# Patient Record
Sex: FEMALE | Race: WHITE | NOT HISPANIC OR LATINO
[De-identification: names, ages, dates, MRNs, and addresses within clinical notes are randomized per-mention and may not be internally consistent; named-entity substitution may affect disease eponyms.]

---

## 2017-01-06 ENCOUNTER — APPOINTMENT (OUTPATIENT)
Dept: OBGYN | Facility: CLINIC | Age: 37
End: 2017-01-06

## 2017-01-06 VITALS
SYSTOLIC BLOOD PRESSURE: 112 MMHG | HEIGHT: 66 IN | DIASTOLIC BLOOD PRESSURE: 72 MMHG | BODY MASS INDEX: 20.25 KG/M2 | WEIGHT: 126 LBS | HEART RATE: 96 BPM

## 2017-01-31 ENCOUNTER — OUTPATIENT (OUTPATIENT)
Dept: OUTPATIENT SERVICES | Facility: HOSPITAL | Age: 37
LOS: 1 days | End: 2017-01-31
Payer: COMMERCIAL

## 2017-01-31 VITALS
SYSTOLIC BLOOD PRESSURE: 103 MMHG | TEMPERATURE: 99 F | HEART RATE: 78 BPM | HEIGHT: 66 IN | WEIGHT: 132.28 LBS | RESPIRATION RATE: 16 BRPM | DIASTOLIC BLOOD PRESSURE: 60 MMHG

## 2017-01-31 DIAGNOSIS — M25.9 JOINT DISORDER, UNSPECIFIED: Chronic | ICD-10-CM

## 2017-01-31 DIAGNOSIS — Z30.430 ENCOUNTER FOR INSERTION OF INTRAUTERINE CONTRACEPTIVE DEVICE: Chronic | ICD-10-CM

## 2017-01-31 DIAGNOSIS — A63.0 ANOGENITAL (VENEREAL) WARTS: ICD-10-CM

## 2017-01-31 DIAGNOSIS — Z01.818 ENCOUNTER FOR OTHER PREPROCEDURAL EXAMINATION: ICD-10-CM

## 2017-01-31 LAB
ANION GAP SERPL CALC-SCNC: 11 MMOL/L — SIGNIFICANT CHANGE UP (ref 5–17)
APTT BLD: 31.4 SEC — SIGNIFICANT CHANGE UP (ref 27.5–37.4)
BASOPHILS # BLD AUTO: 0 K/UL — SIGNIFICANT CHANGE UP (ref 0–0.2)
BASOPHILS NFR BLD AUTO: 0.8 % — SIGNIFICANT CHANGE UP (ref 0–2)
BUN SERPL-MCNC: 8 MG/DL — SIGNIFICANT CHANGE UP (ref 8–20)
CALCIUM SERPL-MCNC: 9.4 MG/DL — SIGNIFICANT CHANGE UP (ref 8.6–10.2)
CHLORIDE SERPL-SCNC: 102 MMOL/L — SIGNIFICANT CHANGE UP (ref 98–107)
CO2 SERPL-SCNC: 28 MMOL/L — SIGNIFICANT CHANGE UP (ref 22–29)
CREAT SERPL-MCNC: 0.71 MG/DL — SIGNIFICANT CHANGE UP (ref 0.5–1.3)
EOSINOPHIL # BLD AUTO: 0.2 K/UL — SIGNIFICANT CHANGE UP (ref 0–0.5)
EOSINOPHIL NFR BLD AUTO: 4.5 % — SIGNIFICANT CHANGE UP (ref 0–6)
GLUCOSE SERPL-MCNC: 91 MG/DL — SIGNIFICANT CHANGE UP (ref 70–115)
HCT VFR BLD CALC: 37.9 % — SIGNIFICANT CHANGE UP (ref 37–47)
HGB BLD-MCNC: 12.6 G/DL — SIGNIFICANT CHANGE UP (ref 12–16)
INR BLD: 1.09 RATIO — SIGNIFICANT CHANGE UP (ref 0.88–1.16)
LYMPHOCYTES # BLD AUTO: 1.8 K/UL — SIGNIFICANT CHANGE UP (ref 1–4.8)
LYMPHOCYTES # BLD AUTO: 36.5 % — SIGNIFICANT CHANGE UP (ref 20–55)
MCHC RBC-ENTMCNC: 30.3 PG — SIGNIFICANT CHANGE UP (ref 27–31)
MCHC RBC-ENTMCNC: 33.2 G/DL — SIGNIFICANT CHANGE UP (ref 32–36)
MCV RBC AUTO: 91.1 FL — SIGNIFICANT CHANGE UP (ref 81–99)
MONOCYTES # BLD AUTO: 0.4 K/UL — SIGNIFICANT CHANGE UP (ref 0–0.8)
MONOCYTES NFR BLD AUTO: 8.2 % — SIGNIFICANT CHANGE UP (ref 3–10)
NEUTROPHILS # BLD AUTO: 2.4 K/UL — SIGNIFICANT CHANGE UP (ref 1.8–8)
NEUTROPHILS NFR BLD AUTO: 50 % — SIGNIFICANT CHANGE UP (ref 37–73)
PLATELET # BLD AUTO: 260 K/UL — SIGNIFICANT CHANGE UP (ref 150–400)
POTASSIUM SERPL-MCNC: 3.5 MMOL/L — SIGNIFICANT CHANGE UP (ref 3.5–5.3)
POTASSIUM SERPL-SCNC: 3.5 MMOL/L — SIGNIFICANT CHANGE UP (ref 3.5–5.3)
PROTHROM AB SERPL-ACNC: 12 SEC — SIGNIFICANT CHANGE UP (ref 10–13.1)
RBC # BLD: 4.16 M/UL — LOW (ref 4.4–5.2)
RBC # FLD: 12.8 % — SIGNIFICANT CHANGE UP (ref 11–15.6)
SODIUM SERPL-SCNC: 141 MMOL/L — SIGNIFICANT CHANGE UP (ref 135–145)
WBC # BLD: 4.85 K/UL — SIGNIFICANT CHANGE UP (ref 4.8–10.8)
WBC # FLD AUTO: 4.85 K/UL — SIGNIFICANT CHANGE UP (ref 4.8–10.8)

## 2017-01-31 NOTE — H&P PST ADULT - NEGATIVE OPHTHALMOLOGIC SYMPTOMS
no pain R/no scleral injection R/no irritation R/no loss of vision L/no blurred vision L/no photophobia/no diplopia/no discharge L/no discharge R/no blurred vision R/no lacrimation L/no loss of vision R/no lacrimation R/no pain L/no irritation L/no scleral injection L

## 2017-01-31 NOTE — H&P PST ADULT - NEGATIVE BREAST SYMPTOMS
no breast tenderness L/no breast tenderness R/no nipple discharge R/no breast lump L/no breast lump R/no nipple discharge L

## 2017-01-31 NOTE — H&P PST ADULT - NEGATIVE FEMALE-SPECIFIC SYMPTOMS
no vaginal discharge/no pelvic pain/no menorrhagia/no abnormal vaginal bleeding/no dysmenorrhea/not applicable/no irregular menses/no amenorrhea/no spotting

## 2017-01-31 NOTE — H&P PST ADULT - RS GEN PE MLT RESP DETAILS PC
no intercostal retractions/airway patent/no subcutaneous emphysema/no rales/respirations non-labored/clear to auscultation bilaterally/breath sounds equal/no rhonchi/no wheezes/no chest wall tenderness/good air movement

## 2017-01-31 NOTE — H&P PST ADULT - NEGATIVE MUSCULOSKELETAL SYMPTOMS
no muscle cramps/no joint swelling/no leg pain L/no arm pain R/no neck pain/no arthritis/no stiffness/no arthralgia/no myalgia/no back pain/no leg pain R/no arm pain L/no muscle weakness

## 2017-01-31 NOTE — H&P PST ADULT - NEGATIVE NEUROLOGICAL SYMPTOMS
no transient paralysis/no syncope/no paresthesias/no tremors/no facial palsy/no loss of sensation/no hemiparesis/no loss of consciousness/no vertigo/no weakness/no confusion/no focal seizures/no difficulty walking/no generalized seizures/no headache

## 2017-01-31 NOTE — H&P PST ADULT - HISTORY OF PRESENT ILLNESS
35 y/o female with h/o HPV now presents for Loop electrosurgical excision procedure colposcopy cervix

## 2017-01-31 NOTE — H&P PST ADULT - NEGATIVE PSYCHIATRIC SYMPTOMS
no paranoia/no suicidal ideation/no mood swings/no visual hallucinations/no agitation/no depression/no insomnia/no anxiety/no memory loss

## 2017-01-31 NOTE — H&P PST ADULT - NEGATIVE ENMT SYMPTOMS
no nasal congestion/no sinus symptoms/no nose bleeds/no recurrent cold sores/no abnormal taste sensation/no vertigo/no dysphagia/no gum bleeding/no tinnitus/no hearing difficulty/no dry mouth/no nasal discharge/no ear pain/no post-nasal discharge/no nasal obstruction

## 2017-01-31 NOTE — H&P PST ADULT - GASTROINTESTINAL DETAILS
no rebound tenderness/no masses palpable/no rigidity/nontender/no distention/no guarding/no bruit/soft/no organomegaly/bowel sounds normal

## 2017-01-31 NOTE — H&P PST ADULT - NEGATIVE SKIN SYMPTOMS
no itching/no pitted nails/no brittle nails/no hair loss/no dryness/no change in size/color of mole/no rash/no tumor

## 2017-01-31 NOTE — H&P PST ADULT - NEGATIVE GENERAL SYMPTOMS
no weight gain/no fever/no weight loss/no malaise/no chills/no sweating/no polyuria/no polydipsia/no polyphagia/no anorexia

## 2017-01-31 NOTE — H&P PST ADULT - NEUROLOGICAL DETAILS
alert and oriented x 3/responds to verbal commands/sensation intact/no spontaneous movement/superficial reflexes intact/deep reflexes intact/cranial nerves intact/responds to pain

## 2017-01-31 NOTE — H&P PST ADULT - NSANTHOSAYNRD_GEN_A_CORE
No. APPLE screening performed.  STOP BANG Legend: 0-2 = LOW Risk; 3-4 = INTERMEDIATE Risk; 5-8 = HIGH Risk

## 2017-01-31 NOTE — H&P PST ADULT - NEGATIVE GENERAL GENITOURINARY SYMPTOMS
no bladder infections/normal urinary frequency/no flank pain L/no renal colic/no dysuria/no incontinence/no flank pain R/normal libido/no urinary hesitancy/no hematuria/no urine discoloration/no gas in urine/no nocturia

## 2017-01-31 NOTE — H&P PST ADULT - NEGATIVE GASTROINTESTINAL SYMPTOMS
no change in bowel habits/no nausea/no steatorrhea/no melena/no jaundice/no constipation/no hematochezia/no abdominal pain/no hiccoughs/no vomiting/no flatulence/no diarrhea

## 2017-01-31 NOTE — ASU PATIENT PROFILE, ADULT - VISION (WITH CORRECTIVE LENSES IF THE PATIENT USUALLY WEARS THEM):
distance/Partially impaired: cannot see medication labels or newsprint, but can see obstacles in path, and the surrounding layout; can count fingers at arm's length

## 2017-02-01 DIAGNOSIS — B37.9 CANDIDIASIS, UNSPECIFIED: ICD-10-CM

## 2017-02-01 DIAGNOSIS — Z01.818 ENCOUNTER FOR OTHER PREPROCEDURAL EXAMINATION: ICD-10-CM

## 2017-02-05 ENCOUNTER — RESULT REVIEW (OUTPATIENT)
Age: 37
End: 2017-02-05

## 2017-02-06 ENCOUNTER — OUTPATIENT (OUTPATIENT)
Dept: OUTPATIENT SERVICES | Facility: HOSPITAL | Age: 37
LOS: 1 days | End: 2017-02-06
Payer: COMMERCIAL

## 2017-02-06 VITALS
DIASTOLIC BLOOD PRESSURE: 50 MMHG | OXYGEN SATURATION: 100 % | WEIGHT: 126.99 LBS | RESPIRATION RATE: 16 BRPM | TEMPERATURE: 99 F | HEART RATE: 56 BPM | HEIGHT: 66 IN | SYSTOLIC BLOOD PRESSURE: 94 MMHG

## 2017-02-06 VITALS — HEART RATE: 72 BPM | OXYGEN SATURATION: 100 % | TEMPERATURE: 98 F | RESPIRATION RATE: 18 BRPM

## 2017-02-06 DIAGNOSIS — Z01.818 ENCOUNTER FOR OTHER PREPROCEDURAL EXAMINATION: ICD-10-CM

## 2017-02-06 DIAGNOSIS — A63.0 ANOGENITAL (VENEREAL) WARTS: ICD-10-CM

## 2017-02-06 DIAGNOSIS — B37.9 CANDIDIASIS, UNSPECIFIED: ICD-10-CM

## 2017-02-06 DIAGNOSIS — Z30.430 ENCOUNTER FOR INSERTION OF INTRAUTERINE CONTRACEPTIVE DEVICE: Chronic | ICD-10-CM

## 2017-02-06 DIAGNOSIS — M25.9 JOINT DISORDER, UNSPECIFIED: Chronic | ICD-10-CM

## 2017-02-06 PROCEDURE — 57460 BX OF CERVIX W/SCOPE LEEP: CPT

## 2017-02-06 PROCEDURE — 57522 CONIZATION OF CERVIX: CPT

## 2017-02-06 PROCEDURE — 88307 TISSUE EXAM BY PATHOLOGIST: CPT | Mod: 26

## 2017-02-06 PROCEDURE — 88307 TISSUE EXAM BY PATHOLOGIST: CPT

## 2017-02-06 RX ORDER — FENTANYL CITRATE 50 UG/ML
25 INJECTION INTRAVENOUS
Qty: 0 | Refills: 0 | Status: DISCONTINUED | OUTPATIENT
Start: 2017-02-06 | End: 2017-02-06

## 2017-02-06 RX ORDER — SODIUM CHLORIDE 9 MG/ML
1000 INJECTION, SOLUTION INTRAVENOUS
Qty: 0 | Refills: 0 | Status: DISCONTINUED | OUTPATIENT
Start: 2017-02-06 | End: 2017-02-06

## 2017-02-06 RX ORDER — ONDANSETRON 8 MG/1
4 TABLET, FILM COATED ORAL ONCE
Qty: 0 | Refills: 0 | Status: DISCONTINUED | OUTPATIENT
Start: 2017-02-06 | End: 2017-02-06

## 2017-02-06 RX ORDER — SODIUM CHLORIDE 9 MG/ML
3 INJECTION INTRAMUSCULAR; INTRAVENOUS; SUBCUTANEOUS ONCE
Qty: 0 | Refills: 0 | Status: DISCONTINUED | OUTPATIENT
Start: 2017-02-06 | End: 2017-02-06

## 2017-02-07 ENCOUNTER — TRANSCRIPTION ENCOUNTER (OUTPATIENT)
Age: 37
End: 2017-02-07

## 2017-02-09 LAB — SURGICAL PATHOLOGY FINAL REPORT - CH: SIGNIFICANT CHANGE UP

## 2017-02-24 ENCOUNTER — TRANSCRIPTION ENCOUNTER (OUTPATIENT)
Age: 37
End: 2017-02-24

## 2017-03-06 ENCOUNTER — APPOINTMENT (OUTPATIENT)
Dept: OBGYN | Facility: CLINIC | Age: 37
End: 2017-03-06

## 2017-03-06 VITALS — HEIGHT: 66 IN | DIASTOLIC BLOOD PRESSURE: 68 MMHG | SYSTOLIC BLOOD PRESSURE: 118 MMHG

## 2017-03-06 LAB
HCG UR QL: NEGATIVE
QUALITY CONTROL: YES

## 2017-03-10 LAB
CORE LAB BIOPSY: NORMAL
CYTOLOGY CVX/VAG DOC THIN PREP: NORMAL
HPV HIGH+LOW RISK DNA PNL CVX: NEGATIVE

## 2017-03-22 ENCOUNTER — APPOINTMENT (OUTPATIENT)
Dept: OBGYN | Facility: CLINIC | Age: 37
End: 2017-03-22

## 2017-03-22 VITALS
DIASTOLIC BLOOD PRESSURE: 70 MMHG | SYSTOLIC BLOOD PRESSURE: 110 MMHG | WEIGHT: 126 LBS | BODY MASS INDEX: 20.25 KG/M2 | HEIGHT: 66 IN

## 2017-04-13 PROCEDURE — 85610 PROTHROMBIN TIME: CPT

## 2017-04-13 PROCEDURE — 80048 BASIC METABOLIC PNL TOTAL CA: CPT

## 2017-04-13 PROCEDURE — G0463: CPT

## 2017-04-13 PROCEDURE — 85730 THROMBOPLASTIN TIME PARTIAL: CPT

## 2017-04-13 PROCEDURE — 85027 COMPLETE CBC AUTOMATED: CPT

## 2017-04-18 ENCOUNTER — APPOINTMENT (OUTPATIENT)
Dept: OBGYN | Facility: CLINIC | Age: 37
End: 2017-04-18

## 2017-04-18 VITALS
DIASTOLIC BLOOD PRESSURE: 68 MMHG | HEIGHT: 66 IN | WEIGHT: 126 LBS | BODY MASS INDEX: 20.25 KG/M2 | SYSTOLIC BLOOD PRESSURE: 103 MMHG | HEART RATE: 69 BPM

## 2017-04-18 LAB
BILIRUB UR QL STRIP: NEGATIVE
GLUCOSE UR-MCNC: NEGATIVE
HCG UR QL: 0.2 EU/DL
HGB UR QL STRIP.AUTO: NORMAL
KETONES UR-MCNC: NEGATIVE
LEUKOCYTE ESTERASE UR QL STRIP: NORMAL
NITRITE UR QL STRIP: POSITIVE
PH UR STRIP: 7
PROT UR STRIP-MCNC: NORMAL
SP GR UR STRIP: 1.02

## 2017-04-24 LAB — BACTERIA UR CULT: ABNORMAL

## 2017-09-08 ENCOUNTER — APPOINTMENT (OUTPATIENT)
Dept: OBGYN | Facility: CLINIC | Age: 37
End: 2017-09-08

## 2018-02-27 ENCOUNTER — NON-APPOINTMENT (OUTPATIENT)
Age: 38
End: 2018-02-27

## 2018-02-27 ENCOUNTER — APPOINTMENT (OUTPATIENT)
Dept: OBGYN | Facility: CLINIC | Age: 38
End: 2018-02-27
Payer: COMMERCIAL

## 2018-02-27 VITALS
WEIGHT: 134 LBS | DIASTOLIC BLOOD PRESSURE: 60 MMHG | SYSTOLIC BLOOD PRESSURE: 100 MMHG | HEIGHT: 66 IN | BODY MASS INDEX: 21.53 KG/M2

## 2018-02-27 PROCEDURE — 76817 TRANSVAGINAL US OBSTETRIC: CPT

## 2018-02-27 PROCEDURE — 0501F PRENATAL FLOW SHEET: CPT

## 2018-03-02 ENCOUNTER — APPOINTMENT (OUTPATIENT)
Dept: MATERNAL FETAL MEDICINE | Facility: CLINIC | Age: 38
End: 2018-03-02

## 2018-03-06 ENCOUNTER — APPOINTMENT (OUTPATIENT)
Dept: ANTEPARTUM | Facility: CLINIC | Age: 38
End: 2018-03-06

## 2018-03-06 ENCOUNTER — ASOB RESULT (OUTPATIENT)
Age: 38
End: 2018-03-06

## 2018-03-06 ENCOUNTER — APPOINTMENT (OUTPATIENT)
Dept: ANTEPARTUM | Facility: CLINIC | Age: 38
End: 2018-03-06
Payer: COMMERCIAL

## 2018-03-06 ENCOUNTER — APPOINTMENT (OUTPATIENT)
Dept: MATERNAL FETAL MEDICINE | Facility: CLINIC | Age: 38
End: 2018-03-06

## 2018-03-06 PROCEDURE — 76801 OB US < 14 WKS SINGLE FETUS: CPT

## 2018-03-06 PROCEDURE — 36416 COLLJ CAPILLARY BLOOD SPEC: CPT

## 2018-03-06 PROCEDURE — 76813 OB US NUCHAL MEAS 1 GEST: CPT

## 2018-03-12 LAB
1ST TRIMESTER DATA: NORMAL
ADDENDUM DOC: NORMAL
AFP PNL SERPL: NORMAL
AFP SERPL-ACNC: NORMAL
CLINICAL BIOCHEMIST REVIEW: ABNORMAL
FREE BETA HCG 1ST TRIMESTER: NORMAL
Lab: NORMAL
NASAL BONE: PRESENT
NOTES NTD: NORMAL
NT: NORMAL
PAPP-A SERPL-ACNC: NORMAL
TRISOMY 18/3: NORMAL

## 2018-03-14 ENCOUNTER — ASOB RESULT (OUTPATIENT)
Age: 38
End: 2018-03-14

## 2018-03-14 ENCOUNTER — APPOINTMENT (OUTPATIENT)
Dept: MATERNAL FETAL MEDICINE | Facility: CLINIC | Age: 38
End: 2018-03-14
Payer: COMMERCIAL

## 2018-03-14 ENCOUNTER — APPOINTMENT (OUTPATIENT)
Dept: ANTEPARTUM | Facility: CLINIC | Age: 38
End: 2018-03-14

## 2018-03-14 PROCEDURE — 99214 OFFICE O/P EST MOD 30 MIN: CPT

## 2018-03-16 ENCOUNTER — LABORATORY RESULT (OUTPATIENT)
Age: 38
End: 2018-03-16

## 2018-03-27 RX ORDER — HUMAN RHO(D) IMMUNE GLOBULIN 300 UG/1
1500 INJECTION, SOLUTION INTRAMUSCULAR
Qty: 1 | Refills: 0 | Status: ACTIVE | COMMUNITY
Start: 2018-03-27 | End: 1900-01-01

## 2018-03-29 ENCOUNTER — APPOINTMENT (OUTPATIENT)
Dept: OBGYN | Facility: CLINIC | Age: 38
End: 2018-03-29

## 2018-04-02 ENCOUNTER — ASOB RESULT (OUTPATIENT)
Age: 38
End: 2018-04-02

## 2018-04-02 ENCOUNTER — LABORATORY RESULT (OUTPATIENT)
Age: 38
End: 2018-04-02

## 2018-04-02 ENCOUNTER — APPOINTMENT (OUTPATIENT)
Dept: MATERNAL FETAL MEDICINE | Facility: CLINIC | Age: 38
End: 2018-04-02
Payer: COMMERCIAL

## 2018-04-02 ENCOUNTER — APPOINTMENT (OUTPATIENT)
Dept: ANTEPARTUM | Facility: CLINIC | Age: 38
End: 2018-04-02
Payer: COMMERCIAL

## 2018-04-02 PROCEDURE — 99241 OFFICE CONSULTATION NEW/ESTAB PATIENT 15 MIN: CPT

## 2018-04-02 PROCEDURE — ZZZZZ: CPT

## 2018-04-02 PROCEDURE — 59000 AMNIOCENTESIS DIAGNOSTIC: CPT

## 2018-04-02 PROCEDURE — 76946 ECHO GUIDE FOR AMNIOCENTESIS: CPT

## 2018-04-02 PROCEDURE — 76816 OB US FOLLOW-UP PER FETUS: CPT

## 2018-04-02 PROCEDURE — 99241 OFFICE CONSULTATION NEW/ESTAB PATIENT 15 MIN: CPT | Mod: 25

## 2018-04-02 RX ORDER — HUMAN RHO(D) IMMUNE GLOBULIN 300 UG/1
1500 INJECTION, SOLUTION INTRAMUSCULAR
Refills: 0 | Status: COMPLETED | OUTPATIENT
Start: 2018-04-02

## 2018-04-02 RX ADMIN — HUMAN RHO(D) IMMUNE GLOBULIN 0 UNIT: 300 INJECTION, SOLUTION INTRAMUSCULAR at 00:00

## 2018-04-03 ENCOUNTER — LABORATORY RESULT (OUTPATIENT)
Age: 38
End: 2018-04-03

## 2018-04-05 ENCOUNTER — APPOINTMENT (OUTPATIENT)
Dept: MATERNAL FETAL MEDICINE | Facility: CLINIC | Age: 38
End: 2018-04-05

## 2018-04-05 ENCOUNTER — APPOINTMENT (OUTPATIENT)
Dept: ANTEPARTUM | Facility: CLINIC | Age: 38
End: 2018-04-05

## 2018-05-01 ENCOUNTER — APPOINTMENT (OUTPATIENT)
Dept: ANTEPARTUM | Facility: CLINIC | Age: 38
End: 2018-05-01
Payer: COMMERCIAL

## 2018-05-01 ENCOUNTER — ASOB RESULT (OUTPATIENT)
Age: 38
End: 2018-05-01

## 2018-05-01 ENCOUNTER — APPOINTMENT (OUTPATIENT)
Dept: MATERNAL FETAL MEDICINE | Facility: CLINIC | Age: 38
End: 2018-05-01
Payer: COMMERCIAL

## 2018-05-01 VITALS
WEIGHT: 142 LBS | SYSTOLIC BLOOD PRESSURE: 98 MMHG | DIASTOLIC BLOOD PRESSURE: 56 MMHG | HEIGHT: 66 IN | BODY MASS INDEX: 22.82 KG/M2

## 2018-05-01 DIAGNOSIS — Z78.9 OTHER SPECIFIED HEALTH STATUS: ICD-10-CM

## 2018-05-01 PROCEDURE — 76811 OB US DETAILED SNGL FETUS: CPT

## 2018-05-01 PROCEDURE — 99244 OFF/OP CNSLTJ NEW/EST MOD 40: CPT

## 2018-05-01 RX ORDER — SULFAMETHOXAZOLE AND TRIMETHOPRIM 800; 160 MG/1; MG/1
800-160 TABLET ORAL TWICE DAILY
Qty: 6 | Refills: 1 | Status: DISCONTINUED | COMMUNITY
Start: 2017-04-18 | End: 2018-05-01

## 2018-05-01 RX ORDER — FLUCONAZOLE 150 MG/1
150 TABLET ORAL
Qty: 1 | Refills: 1 | Status: DISCONTINUED | COMMUNITY
Start: 2017-06-30 | End: 2018-05-01

## 2018-05-01 RX ORDER — VITAMIN C, CALCIUM, IRON, VITAMIN D3, VITAMIN E, VITAMIN B1, VITAMIN B2, VITAMIN B3, VITAMIN B6, FOLIC ACID, IODINE, ZINC, COPPER, DOCUSATE SODIUM, DOCOSAHEXAENOIC ACID (DHA) 27-1-50 MG
KIT ORAL
Refills: 0 | Status: ACTIVE | COMMUNITY

## 2018-05-01 RX ORDER — ACETAMINOPHEN/DIPHENHYDRAMINE 500MG-25MG
1000 TABLET ORAL
Refills: 0 | Status: ACTIVE | COMMUNITY

## 2018-05-02 ENCOUNTER — APPOINTMENT (OUTPATIENT)
Dept: OBGYN | Facility: CLINIC | Age: 38
End: 2018-05-02
Payer: COMMERCIAL

## 2018-05-02 VITALS
BODY MASS INDEX: 23.14 KG/M2 | DIASTOLIC BLOOD PRESSURE: 64 MMHG | SYSTOLIC BLOOD PRESSURE: 100 MMHG | WEIGHT: 144 LBS | HEIGHT: 66 IN

## 2018-05-02 PROCEDURE — 0502F SUBSEQUENT PRENATAL CARE: CPT

## 2018-05-29 ENCOUNTER — APPOINTMENT (OUTPATIENT)
Dept: OBGYN | Facility: CLINIC | Age: 38
End: 2018-05-29
Payer: COMMERCIAL

## 2018-05-29 VITALS
SYSTOLIC BLOOD PRESSURE: 103 MMHG | BODY MASS INDEX: 23.78 KG/M2 | WEIGHT: 148 LBS | DIASTOLIC BLOOD PRESSURE: 71 MMHG | HEIGHT: 66 IN

## 2018-05-29 PROCEDURE — 0502F SUBSEQUENT PRENATAL CARE: CPT

## 2018-06-04 ENCOUNTER — LABORATORY RESULT (OUTPATIENT)
Age: 38
End: 2018-06-04

## 2018-06-05 ENCOUNTER — APPOINTMENT (OUTPATIENT)
Dept: OBGYN | Facility: CLINIC | Age: 38
End: 2018-06-05
Payer: COMMERCIAL

## 2018-06-05 ENCOUNTER — APPOINTMENT (OUTPATIENT)
Dept: ANTEPARTUM | Facility: CLINIC | Age: 38
End: 2018-06-05
Payer: COMMERCIAL

## 2018-06-05 ENCOUNTER — ASOB RESULT (OUTPATIENT)
Age: 38
End: 2018-06-05

## 2018-06-05 VITALS
BODY MASS INDEX: 23.78 KG/M2 | HEIGHT: 66 IN | WEIGHT: 148 LBS | DIASTOLIC BLOOD PRESSURE: 66 MMHG | SYSTOLIC BLOOD PRESSURE: 105 MMHG

## 2018-06-05 PROCEDURE — 0502F SUBSEQUENT PRENATAL CARE: CPT

## 2018-06-05 PROCEDURE — 76817 TRANSVAGINAL US OBSTETRIC: CPT

## 2018-06-05 PROCEDURE — 76815 OB US LIMITED FETUS(S): CPT

## 2018-06-26 ENCOUNTER — APPOINTMENT (OUTPATIENT)
Dept: OBGYN | Facility: CLINIC | Age: 38
End: 2018-06-26
Payer: COMMERCIAL

## 2018-06-26 VITALS
SYSTOLIC BLOOD PRESSURE: 114 MMHG | WEIGHT: 152 LBS | BODY MASS INDEX: 24.43 KG/M2 | HEIGHT: 66 IN | DIASTOLIC BLOOD PRESSURE: 77 MMHG

## 2018-06-26 PROCEDURE — 0502F SUBSEQUENT PRENATAL CARE: CPT

## 2018-06-27 ENCOUNTER — ASOB RESULT (OUTPATIENT)
Age: 38
End: 2018-06-27

## 2018-06-27 ENCOUNTER — APPOINTMENT (OUTPATIENT)
Dept: ANTEPARTUM | Facility: CLINIC | Age: 38
End: 2018-06-27
Payer: COMMERCIAL

## 2018-06-27 PROCEDURE — 76820 UMBILICAL ARTERY ECHO: CPT

## 2018-06-27 PROCEDURE — 76816 OB US FOLLOW-UP PER FETUS: CPT

## 2018-06-27 PROCEDURE — 76819 FETAL BIOPHYS PROFIL W/O NST: CPT

## 2018-07-12 ENCOUNTER — LABORATORY RESULT (OUTPATIENT)
Age: 38
End: 2018-07-12

## 2018-07-13 ENCOUNTER — APPOINTMENT (OUTPATIENT)
Dept: OBGYN | Facility: CLINIC | Age: 38
End: 2018-07-13
Payer: COMMERCIAL

## 2018-07-13 VITALS
BODY MASS INDEX: 24.75 KG/M2 | DIASTOLIC BLOOD PRESSURE: 60 MMHG | WEIGHT: 154 LBS | SYSTOLIC BLOOD PRESSURE: 110 MMHG | HEIGHT: 66 IN

## 2018-07-13 PROCEDURE — 0502F SUBSEQUENT PRENATAL CARE: CPT

## 2018-07-16 PROBLEM — A63.0 ANOGENITAL (VENEREAL) WARTS: Chronic | Status: ACTIVE | Noted: 2017-01-31

## 2018-07-20 ENCOUNTER — APPOINTMENT (OUTPATIENT)
Dept: OBGYN | Facility: CLINIC | Age: 38
End: 2018-07-20
Payer: COMMERCIAL

## 2018-07-20 VITALS
WEIGHT: 156 LBS | BODY MASS INDEX: 25.07 KG/M2 | HEIGHT: 66 IN | DIASTOLIC BLOOD PRESSURE: 60 MMHG | SYSTOLIC BLOOD PRESSURE: 120 MMHG

## 2018-07-20 PROCEDURE — 0502F SUBSEQUENT PRENATAL CARE: CPT

## 2018-07-25 ENCOUNTER — APPOINTMENT (OUTPATIENT)
Dept: ANTEPARTUM | Facility: CLINIC | Age: 38
End: 2018-07-25

## 2018-07-25 ENCOUNTER — ASOB RESULT (OUTPATIENT)
Age: 38
End: 2018-07-25

## 2018-07-25 ENCOUNTER — APPOINTMENT (OUTPATIENT)
Dept: ANTEPARTUM | Facility: CLINIC | Age: 38
End: 2018-07-25
Payer: COMMERCIAL

## 2018-07-25 ENCOUNTER — APPOINTMENT (OUTPATIENT)
Dept: MATERNAL FETAL MEDICINE | Facility: CLINIC | Age: 38
End: 2018-07-25
Payer: COMMERCIAL

## 2018-07-25 VITALS
HEIGHT: 66 IN | SYSTOLIC BLOOD PRESSURE: 108 MMHG | WEIGHT: 159.56 LBS | BODY MASS INDEX: 25.64 KG/M2 | DIASTOLIC BLOOD PRESSURE: 64 MMHG

## 2018-07-25 PROCEDURE — 76819 FETAL BIOPHYS PROFIL W/O NST: CPT

## 2018-07-25 PROCEDURE — 76816 OB US FOLLOW-UP PER FETUS: CPT

## 2018-07-25 PROCEDURE — 99244 OFF/OP CNSLTJ NEW/EST MOD 40: CPT

## 2018-07-25 PROCEDURE — 76820 UMBILICAL ARTERY ECHO: CPT

## 2018-07-27 ENCOUNTER — APPOINTMENT (OUTPATIENT)
Dept: OBGYN | Facility: CLINIC | Age: 38
End: 2018-07-27
Payer: COMMERCIAL

## 2018-07-27 VITALS
WEIGHT: 159 LBS | DIASTOLIC BLOOD PRESSURE: 70 MMHG | BODY MASS INDEX: 25.55 KG/M2 | HEIGHT: 66 IN | SYSTOLIC BLOOD PRESSURE: 112 MMHG

## 2018-07-27 PROCEDURE — 99212 OFFICE O/P EST SF 10 MIN: CPT | Mod: 25

## 2018-07-27 PROCEDURE — 0502F SUBSEQUENT PRENATAL CARE: CPT

## 2018-07-27 PROCEDURE — 96372 THER/PROPH/DIAG INJ SC/IM: CPT

## 2018-07-31 ENCOUNTER — APPOINTMENT (OUTPATIENT)
Dept: OBGYN | Facility: CLINIC | Age: 38
End: 2018-07-31
Payer: COMMERCIAL

## 2018-07-31 ENCOUNTER — LABORATORY RESULT (OUTPATIENT)
Age: 38
End: 2018-07-31

## 2018-07-31 PROCEDURE — 59025 FETAL NON-STRESS TEST: CPT

## 2018-07-31 PROCEDURE — 0502F SUBSEQUENT PRENATAL CARE: CPT

## 2018-08-03 ENCOUNTER — APPOINTMENT (OUTPATIENT)
Dept: OBGYN | Facility: CLINIC | Age: 38
End: 2018-08-03

## 2018-08-07 ENCOUNTER — APPOINTMENT (OUTPATIENT)
Dept: OBGYN | Facility: CLINIC | Age: 38
End: 2018-08-07
Payer: COMMERCIAL

## 2018-08-07 LAB
BASOPHILS # BLD AUTO: 0 K/UL
BASOPHILS NFR BLD AUTO: 0 %
EOSINOPHIL # BLD AUTO: 0.15 K/UL
EOSINOPHIL NFR BLD AUTO: 2.5 %
HCT VFR BLD CALC: 37 %
HGB BLD-MCNC: 12.3 G/DL
LYMPHOCYTES # BLD AUTO: 1.3 K/UL
LYMPHOCYTES NFR BLD AUTO: 22 %
MAN DIFF?: NORMAL
MCHC RBC-ENTMCNC: 31.7 PG
MCHC RBC-ENTMCNC: 33.2 GM/DL
MCV RBC AUTO: 95.4 FL
MONOCYTES # BLD AUTO: 0.4 K/UL
MONOCYTES NFR BLD AUTO: 6.8 %
NEUTROPHILS # BLD AUTO: 4.07 K/UL
NEUTROPHILS NFR BLD AUTO: 66.2 %
PLATELET # BLD AUTO: 184 K/UL
RBC # BLD: 3.88 M/UL
RBC # FLD: 14.2 %
WBC # FLD AUTO: 5.92 K/UL

## 2018-08-07 PROCEDURE — 59025 FETAL NON-STRESS TEST: CPT

## 2018-08-07 PROCEDURE — 0502F SUBSEQUENT PRENATAL CARE: CPT

## 2018-08-15 ENCOUNTER — LABORATORY RESULT (OUTPATIENT)
Age: 38
End: 2018-08-15

## 2018-08-15 ENCOUNTER — APPOINTMENT (OUTPATIENT)
Dept: OBGYN | Facility: CLINIC | Age: 38
End: 2018-08-15
Payer: COMMERCIAL

## 2018-08-15 VITALS
DIASTOLIC BLOOD PRESSURE: 70 MMHG | BODY MASS INDEX: 26.36 KG/M2 | HEIGHT: 66 IN | SYSTOLIC BLOOD PRESSURE: 120 MMHG | WEIGHT: 164 LBS

## 2018-08-15 PROCEDURE — 59025 FETAL NON-STRESS TEST: CPT

## 2018-08-15 PROCEDURE — 0502F SUBSEQUENT PRENATAL CARE: CPT

## 2018-08-22 ENCOUNTER — APPOINTMENT (OUTPATIENT)
Dept: ANTEPARTUM | Facility: CLINIC | Age: 38
End: 2018-08-22
Payer: COMMERCIAL

## 2018-08-22 ENCOUNTER — APPOINTMENT (OUTPATIENT)
Dept: OBGYN | Facility: CLINIC | Age: 38
End: 2018-08-22
Payer: COMMERCIAL

## 2018-08-22 ENCOUNTER — ASOB RESULT (OUTPATIENT)
Age: 38
End: 2018-08-22

## 2018-08-22 VITALS
SYSTOLIC BLOOD PRESSURE: 110 MMHG | DIASTOLIC BLOOD PRESSURE: 70 MMHG | HEIGHT: 66 IN | WEIGHT: 166 LBS | BODY MASS INDEX: 26.68 KG/M2

## 2018-08-22 PROCEDURE — 76816 OB US FOLLOW-UP PER FETUS: CPT

## 2018-08-22 PROCEDURE — 0502F SUBSEQUENT PRENATAL CARE: CPT

## 2018-08-22 PROCEDURE — 76819 FETAL BIOPHYS PROFIL W/O NST: CPT

## 2018-08-22 PROCEDURE — 76820 UMBILICAL ARTERY ECHO: CPT

## 2018-08-22 PROCEDURE — 59025 FETAL NON-STRESS TEST: CPT

## 2018-08-29 ENCOUNTER — ASOB RESULT (OUTPATIENT)
Age: 38
End: 2018-08-29

## 2018-08-29 ENCOUNTER — APPOINTMENT (OUTPATIENT)
Dept: ANTEPARTUM | Facility: CLINIC | Age: 38
End: 2018-08-29
Payer: COMMERCIAL

## 2018-08-29 ENCOUNTER — APPOINTMENT (OUTPATIENT)
Dept: OBGYN | Facility: CLINIC | Age: 38
End: 2018-08-29
Payer: COMMERCIAL

## 2018-08-29 VITALS
DIASTOLIC BLOOD PRESSURE: 60 MMHG | SYSTOLIC BLOOD PRESSURE: 120 MMHG | HEIGHT: 66 IN | BODY MASS INDEX: 26.36 KG/M2 | WEIGHT: 164 LBS

## 2018-08-29 PROCEDURE — 76819 FETAL BIOPHYS PROFIL W/O NST: CPT

## 2018-08-29 PROCEDURE — 59025 FETAL NON-STRESS TEST: CPT

## 2018-08-29 PROCEDURE — 0502F SUBSEQUENT PRENATAL CARE: CPT

## 2018-09-03 LAB
ALBUMIN SERPL ELPH-MCNC: 3.3 G/DL
ALP BLD-CCNC: 140 U/L
ALT SERPL-CCNC: 14 U/L
ANION GAP SERPL CALC-SCNC: 11 MMOL/L
APTT BLD: 26.3 SEC
AST SERPL-CCNC: 22 U/L
BASOPHILS # BLD AUTO: 0.02 K/UL
BASOPHILS NFR BLD AUTO: 0.3 %
BILE AC SER-MCNC: 8.7 UMOL/L
BILEACIDS T: 4.3 UMOL/L
BILIRUB SERPL-MCNC: 0.2 MG/DL
BUN SERPL-MCNC: 7 MG/DL
CALCIUM SERPL-MCNC: 9.2 MG/DL
CHENDEOXYCHOLIC ACID: 1.5 UMOL/L
CHLORIDE SERPL-SCNC: 103 MMOL/L
CHOLIC ACID: 1.5 UMOL/L
CO2 SERPL-SCNC: 23 MMOL/L
CREAT SERPL-MCNC: 0.61 MG/DL
DEOXYCHOLIC ACID: 1.3 UMOL/L
EOSINOPHIL # BLD AUTO: 0.12 K/UL
EOSINOPHIL NFR BLD AUTO: 2 %
FIBRINOGEN PPP COAG.DERIVED-MCNC: 569 MG/DL
GLUCOSE SERPL-MCNC: 78 MG/DL
HCT VFR BLD CALC: 37.5 %
HGB BLD-MCNC: 12.4 G/DL
IMM GRANULOCYTES NFR BLD AUTO: 0.7 %
INR PPP: 0.87 RATIO
LYMPHOCYTES # BLD AUTO: 1.5 K/UL
LYMPHOCYTES NFR BLD AUTO: 24.9 %
MAN DIFF?: NORMAL
MCHC RBC-ENTMCNC: 31.8 PG
MCHC RBC-ENTMCNC: 33.1 GM/DL
MCV RBC AUTO: 96.2 FL
MONOCYTES # BLD AUTO: 0.64 K/UL
MONOCYTES NFR BLD AUTO: 10.6 %
NEUTROPHILS # BLD AUTO: 3.71 K/UL
NEUTROPHILS NFR BLD AUTO: 61.5 %
PLATELET # BLD AUTO: 170 K/UL
POTASSIUM SERPL-SCNC: 4.1 MMOL/L
PROT SERPL-MCNC: 5.2 G/DL
PT BLD: 9.8 SEC
RBC # BLD: 3.9 M/UL
RBC # FLD: 14 %
SODIUM SERPL-SCNC: 137 MMOL/L
URATE SERPL-MCNC: 4.7 MG/DL
URSODEOXYCH: <0.1 UMOL/L
WBC # FLD AUTO: 6.03 K/UL

## 2018-09-05 ENCOUNTER — APPOINTMENT (OUTPATIENT)
Dept: ANTEPARTUM | Facility: CLINIC | Age: 38
End: 2018-09-05
Payer: COMMERCIAL

## 2018-09-05 ENCOUNTER — ASOB RESULT (OUTPATIENT)
Age: 38
End: 2018-09-05

## 2018-09-05 ENCOUNTER — APPOINTMENT (OUTPATIENT)
Dept: OBGYN | Facility: CLINIC | Age: 38
End: 2018-09-05
Payer: COMMERCIAL

## 2018-09-05 VITALS
WEIGHT: 166 LBS | SYSTOLIC BLOOD PRESSURE: 120 MMHG | HEIGHT: 66 IN | BODY MASS INDEX: 26.68 KG/M2 | DIASTOLIC BLOOD PRESSURE: 70 MMHG

## 2018-09-05 PROCEDURE — 76819 FETAL BIOPHYS PROFIL W/O NST: CPT

## 2018-09-05 PROCEDURE — 0501F PRENATAL FLOW SHEET: CPT

## 2018-09-11 ENCOUNTER — APPOINTMENT (OUTPATIENT)
Dept: ANTEPARTUM | Facility: CLINIC | Age: 38
End: 2018-09-11
Payer: COMMERCIAL

## 2018-09-11 ENCOUNTER — ASOB RESULT (OUTPATIENT)
Age: 38
End: 2018-09-11

## 2018-09-11 ENCOUNTER — APPOINTMENT (OUTPATIENT)
Dept: OBGYN | Facility: CLINIC | Age: 38
End: 2018-09-11
Payer: COMMERCIAL

## 2018-09-11 VITALS
DIASTOLIC BLOOD PRESSURE: 70 MMHG | BODY MASS INDEX: 26.68 KG/M2 | WEIGHT: 166 LBS | SYSTOLIC BLOOD PRESSURE: 120 MMHG | HEIGHT: 66 IN

## 2018-09-11 PROCEDURE — 76815 OB US LIMITED FETUS(S): CPT

## 2018-09-11 PROCEDURE — 0502F SUBSEQUENT PRENATAL CARE: CPT

## 2018-09-11 PROCEDURE — 59025 FETAL NON-STRESS TEST: CPT

## 2018-09-11 PROCEDURE — 76819 FETAL BIOPHYS PROFIL W/O NST: CPT

## 2018-09-13 ENCOUNTER — INPATIENT (INPATIENT)
Facility: HOSPITAL | Age: 38
LOS: 3 days | Discharge: HOME | End: 2018-09-17
Attending: OBSTETRICS & GYNECOLOGY | Admitting: OBSTETRICS & GYNECOLOGY
Payer: COMMERCIAL

## 2018-09-13 DIAGNOSIS — O47.1 FALSE LABOR AT OR AFTER 37 COMPLETED WEEKS OF GESTATION: ICD-10-CM

## 2018-09-13 DIAGNOSIS — Z30.430 ENCOUNTER FOR INSERTION OF INTRAUTERINE CONTRACEPTIVE DEVICE: Chronic | ICD-10-CM

## 2018-09-13 DIAGNOSIS — M25.9 JOINT DISORDER, UNSPECIFIED: Chronic | ICD-10-CM

## 2018-09-13 LAB
ALLERGY+IMMUNOLOGY DIAG STUDY NOTE: SIGNIFICANT CHANGE UP
APPEARANCE UR: CLEAR — SIGNIFICANT CHANGE UP
BACTERIA # UR AUTO: ABNORMAL
BASOPHILS # BLD AUTO: 0 K/UL — SIGNIFICANT CHANGE UP (ref 0–0.2)
BASOPHILS NFR BLD AUTO: 0.2 % — SIGNIFICANT CHANGE UP (ref 0–2)
BILIRUB UR-MCNC: NEGATIVE — SIGNIFICANT CHANGE UP
BLD GP AB SCN SERPL QL: ABNORMAL
COLOR SPEC: YELLOW — SIGNIFICANT CHANGE UP
DIFF PNL FLD: NEGATIVE — SIGNIFICANT CHANGE UP
DIR ANTIGLOB POLYSPECIFIC INTERPRETATION: SIGNIFICANT CHANGE UP
EOSINOPHIL # BLD AUTO: 0.1 K/UL — SIGNIFICANT CHANGE UP (ref 0–0.5)
EOSINOPHIL NFR BLD AUTO: 1.1 % — SIGNIFICANT CHANGE UP (ref 0–6)
EPI CELLS # UR: SIGNIFICANT CHANGE UP
GLUCOSE UR QL: NEGATIVE MG/DL — SIGNIFICANT CHANGE UP
HCT VFR BLD CALC: 35.1 % — LOW (ref 37–47)
HGB BLD-MCNC: 11.4 G/DL — LOW (ref 12–16)
KETONES UR-MCNC: NEGATIVE — SIGNIFICANT CHANGE UP
LEUKOCYTE ESTERASE UR-ACNC: ABNORMAL
LYMPHOCYTES # BLD AUTO: 1.3 K/UL — SIGNIFICANT CHANGE UP (ref 1–4.8)
LYMPHOCYTES # BLD AUTO: 22.4 % — SIGNIFICANT CHANGE UP (ref 20–55)
MCHC RBC-ENTMCNC: 30.4 PG — SIGNIFICANT CHANGE UP (ref 27–31)
MCHC RBC-ENTMCNC: 32.5 G/DL — SIGNIFICANT CHANGE UP (ref 32–36)
MCV RBC AUTO: 93.6 FL — SIGNIFICANT CHANGE UP (ref 81–99)
MONOCYTES # BLD AUTO: 0.5 K/UL — SIGNIFICANT CHANGE UP (ref 0–0.8)
MONOCYTES NFR BLD AUTO: 8.5 % — SIGNIFICANT CHANGE UP (ref 3–10)
NEUTROPHILS # BLD AUTO: 3.8 K/UL — SIGNIFICANT CHANGE UP (ref 1.8–8)
NEUTROPHILS NFR BLD AUTO: 67.4 % — SIGNIFICANT CHANGE UP (ref 37–73)
NITRITE UR-MCNC: NEGATIVE — SIGNIFICANT CHANGE UP
PH UR: 6 — SIGNIFICANT CHANGE UP (ref 5–8)
PLATELET # BLD AUTO: 140 K/UL — LOW (ref 150–400)
PROT UR-MCNC: 15 MG/DL
RBC # BLD: 3.75 M/UL — LOW (ref 4.4–5.2)
RBC # FLD: 13.2 % — SIGNIFICANT CHANGE UP (ref 11–15.6)
RBC CASTS # UR COMP ASSIST: SIGNIFICANT CHANGE UP /HPF (ref 0–4)
SP GR SPEC: 1.01 — SIGNIFICANT CHANGE UP (ref 1.01–1.02)
TYPE + AB SCN PNL BLD: SIGNIFICANT CHANGE UP
UROBILINOGEN FLD QL: NEGATIVE MG/DL — SIGNIFICANT CHANGE UP
WBC # BLD: 5.6 K/UL — SIGNIFICANT CHANGE UP (ref 4.8–10.8)
WBC # FLD AUTO: 5.6 K/UL — SIGNIFICANT CHANGE UP (ref 4.8–10.8)
WBC UR QL: SIGNIFICANT CHANGE UP

## 2018-09-13 PROCEDURE — 86077 PHYS BLOOD BANK SERV XMATCH: CPT

## 2018-09-13 RX ORDER — CITRIC ACID/SODIUM CITRATE 300-500 MG
30 SOLUTION, ORAL ORAL ONCE
Qty: 0 | Refills: 0 | Status: COMPLETED | OUTPATIENT
Start: 2018-09-13 | End: 2018-09-14

## 2018-09-13 RX ORDER — SODIUM CHLORIDE 9 MG/ML
1000 INJECTION, SOLUTION INTRAVENOUS
Qty: 0 | Refills: 0 | Status: DISCONTINUED | OUTPATIENT
Start: 2018-09-13 | End: 2018-09-14

## 2018-09-13 RX ORDER — OXYTOCIN 10 UNIT/ML
333.33 VIAL (ML) INJECTION
Qty: 20 | Refills: 0 | Status: DISCONTINUED | OUTPATIENT
Start: 2018-09-14 | End: 2018-09-14

## 2018-09-13 RX ORDER — SODIUM CHLORIDE 9 MG/ML
1000 INJECTION, SOLUTION INTRAVENOUS ONCE
Qty: 0 | Refills: 0 | Status: COMPLETED | OUTPATIENT
Start: 2018-09-13 | End: 2018-09-14

## 2018-09-14 VITALS — WEIGHT: 163.14 LBS | HEIGHT: 66 IN

## 2018-09-14 LAB
BASE EXCESS BLDCOA CALC-SCNC: -1.6 MMOL/L — SIGNIFICANT CHANGE UP (ref -2–2)
BASE EXCESS BLDCOV CALC-SCNC: -0.3 MMOL/L — SIGNIFICANT CHANGE UP (ref -2–2)
GAS PNL BLDCOV: 7.35 — SIGNIFICANT CHANGE UP (ref 7.25–7.45)
HCO3 BLDCOA-SCNC: 22 MMOL/L — SIGNIFICANT CHANGE UP (ref 21–29)
HCO3 BLDCOV-SCNC: 23 MMOL/L — SIGNIFICANT CHANGE UP (ref 21–29)
PCO2 BLDCOA: 50.5 MMHG — SIGNIFICANT CHANGE UP (ref 32–68)
PCO2 BLDCOV: 46.6 MMHG — SIGNIFICANT CHANGE UP (ref 29–53)
PH BLDCOA: 7.31 — SIGNIFICANT CHANGE UP (ref 7.18–7.38)
PO2 BLDCOA: 25.5 MMHG — SIGNIFICANT CHANGE UP (ref 17–41)
PO2 BLDCOA: 29.1 MMHG — SIGNIFICANT CHANGE UP (ref 5.7–30.5)
SAO2 % BLDCOA: SIGNIFICANT CHANGE UP
SAO2 % BLDCOV: SIGNIFICANT CHANGE UP

## 2018-09-14 PROCEDURE — 59510 CESAREAN DELIVERY: CPT

## 2018-09-14 RX ORDER — SIMETHICONE 80 MG/1
80 TABLET, CHEWABLE ORAL EVERY 4 HOURS
Qty: 0 | Refills: 0 | Status: DISCONTINUED | OUTPATIENT
Start: 2018-09-14 | End: 2018-09-17

## 2018-09-14 RX ORDER — SODIUM CHLORIDE 9 MG/ML
1000 INJECTION, SOLUTION INTRAVENOUS
Qty: 0 | Refills: 0 | Status: DISCONTINUED | OUTPATIENT
Start: 2018-09-14 | End: 2018-09-17

## 2018-09-14 RX ORDER — SODIUM CHLORIDE 9 MG/ML
1000 INJECTION, SOLUTION INTRAVENOUS
Qty: 0 | Refills: 0 | Status: DISCONTINUED | OUTPATIENT
Start: 2018-09-14 | End: 2018-09-14

## 2018-09-14 RX ORDER — OXYTOCIN 10 UNIT/ML
41.67 VIAL (ML) INJECTION
Qty: 20 | Refills: 0 | Status: DISCONTINUED | OUTPATIENT
Start: 2018-09-14 | End: 2018-09-14

## 2018-09-14 RX ORDER — OXYTOCIN 10 UNIT/ML
333.33 VIAL (ML) INJECTION
Qty: 20 | Refills: 0 | Status: DISCONTINUED | OUTPATIENT
Start: 2018-09-14 | End: 2018-09-17

## 2018-09-14 RX ORDER — OXYTOCIN 10 UNIT/ML
2 VIAL (ML) INJECTION
Qty: 30 | Refills: 0 | Status: DISCONTINUED | OUTPATIENT
Start: 2018-09-14 | End: 2018-09-17

## 2018-09-14 RX ORDER — TETANUS TOXOID, REDUCED DIPHTHERIA TOXOID AND ACELLULAR PERTUSSIS VACCINE, ADSORBED 5; 2.5; 8; 8; 2.5 [IU]/.5ML; [IU]/.5ML; UG/.5ML; UG/.5ML; UG/.5ML
0.5 SUSPENSION INTRAMUSCULAR ONCE
Qty: 0 | Refills: 0 | Status: DISCONTINUED | OUTPATIENT
Start: 2018-09-14 | End: 2018-09-17

## 2018-09-14 RX ORDER — ACETAMINOPHEN 500 MG
1000 TABLET ORAL ONCE
Qty: 0 | Refills: 0 | Status: DISCONTINUED | OUTPATIENT
Start: 2018-09-14 | End: 2018-09-17

## 2018-09-14 RX ORDER — AZITHROMYCIN 500 MG/1
500 TABLET, FILM COATED ORAL ONCE
Qty: 0 | Refills: 0 | Status: COMPLETED | OUTPATIENT
Start: 2018-09-14 | End: 2018-09-14

## 2018-09-14 RX ORDER — CEFAZOLIN SODIUM 1 G
2000 VIAL (EA) INJECTION ONCE
Qty: 0 | Refills: 0 | Status: COMPLETED | OUTPATIENT
Start: 2018-09-14 | End: 2018-09-14

## 2018-09-14 RX ORDER — DIPHENHYDRAMINE HCL 50 MG
25 CAPSULE ORAL EVERY 6 HOURS
Qty: 0 | Refills: 0 | Status: DISCONTINUED | OUTPATIENT
Start: 2018-09-14 | End: 2018-09-17

## 2018-09-14 RX ORDER — GLYCERIN ADULT
1 SUPPOSITORY, RECTAL RECTAL AT BEDTIME
Qty: 0 | Refills: 0 | Status: DISCONTINUED | OUTPATIENT
Start: 2018-09-14 | End: 2018-09-17

## 2018-09-14 RX ORDER — ONDANSETRON 8 MG/1
4 TABLET, FILM COATED ORAL ONCE
Qty: 0 | Refills: 0 | Status: DISCONTINUED | OUTPATIENT
Start: 2018-09-14 | End: 2018-09-14

## 2018-09-14 RX ORDER — DIPHENHYDRAMINE HCL 50 MG
25 CAPSULE ORAL EVERY 4 HOURS
Qty: 0 | Refills: 0 | Status: DISCONTINUED | OUTPATIENT
Start: 2018-09-14 | End: 2018-09-17

## 2018-09-14 RX ORDER — NALOXONE HYDROCHLORIDE 4 MG/.1ML
0.1 SPRAY NASAL
Qty: 0 | Refills: 0 | Status: DISCONTINUED | OUTPATIENT
Start: 2018-09-14 | End: 2018-09-17

## 2018-09-14 RX ORDER — FERROUS SULFATE 325(65) MG
325 TABLET ORAL DAILY
Qty: 0 | Refills: 0 | Status: DISCONTINUED | OUTPATIENT
Start: 2018-09-14 | End: 2018-09-17

## 2018-09-14 RX ORDER — NALBUPHINE HYDROCHLORIDE 10 MG/ML
2.5 INJECTION, SOLUTION INTRAMUSCULAR; INTRAVENOUS; SUBCUTANEOUS EVERY 6 HOURS
Qty: 0 | Refills: 0 | Status: DISCONTINUED | OUTPATIENT
Start: 2018-09-14 | End: 2018-09-17

## 2018-09-14 RX ORDER — ONDANSETRON 8 MG/1
4 TABLET, FILM COATED ORAL EVERY 6 HOURS
Qty: 0 | Refills: 0 | Status: DISCONTINUED | OUTPATIENT
Start: 2018-09-14 | End: 2018-09-17

## 2018-09-14 RX ORDER — KETOROLAC TROMETHAMINE 30 MG/ML
30 SYRINGE (ML) INJECTION ONCE
Qty: 0 | Refills: 0 | Status: DISCONTINUED | OUTPATIENT
Start: 2018-09-14 | End: 2018-09-14

## 2018-09-14 RX ORDER — KETOROLAC TROMETHAMINE 30 MG/ML
30 SYRINGE (ML) INJECTION EVERY 6 HOURS
Qty: 0 | Refills: 0 | Status: DISCONTINUED | OUTPATIENT
Start: 2018-09-14 | End: 2018-09-17

## 2018-09-14 RX ORDER — NALBUPHINE HYDROCHLORIDE 10 MG/ML
5 INJECTION, SOLUTION INTRAMUSCULAR; INTRAVENOUS; SUBCUTANEOUS ONCE
Qty: 0 | Refills: 0 | Status: DISCONTINUED | OUTPATIENT
Start: 2018-09-14 | End: 2018-09-17

## 2018-09-14 RX ORDER — OXYTOCIN 10 UNIT/ML
41.67 VIAL (ML) INJECTION
Qty: 20 | Refills: 0 | Status: DISCONTINUED | OUTPATIENT
Start: 2018-09-14 | End: 2018-09-17

## 2018-09-14 RX ORDER — OXYCODONE AND ACETAMINOPHEN 5; 325 MG/1; MG/1
1 TABLET ORAL
Qty: 0 | Refills: 0 | Status: DISCONTINUED | OUTPATIENT
Start: 2018-09-14 | End: 2018-09-17

## 2018-09-14 RX ORDER — IBUPROFEN 200 MG
600 TABLET ORAL EVERY 6 HOURS
Qty: 0 | Refills: 0 | Status: DISCONTINUED | OUTPATIENT
Start: 2018-09-14 | End: 2018-09-17

## 2018-09-14 RX ORDER — LANOLIN
1 OINTMENT (GRAM) TOPICAL
Qty: 0 | Refills: 0 | Status: DISCONTINUED | OUTPATIENT
Start: 2018-09-14 | End: 2018-09-17

## 2018-09-14 RX ORDER — DIPHENHYDRAMINE HCL 50 MG
25 CAPSULE ORAL ONCE
Qty: 0 | Refills: 0 | Status: DISCONTINUED | OUTPATIENT
Start: 2018-09-14 | End: 2018-09-17

## 2018-09-14 RX ORDER — DOCUSATE SODIUM 100 MG
100 CAPSULE ORAL
Qty: 0 | Refills: 0 | Status: DISCONTINUED | OUTPATIENT
Start: 2018-09-14 | End: 2018-09-17

## 2018-09-14 RX ORDER — OXYCODONE AND ACETAMINOPHEN 5; 325 MG/1; MG/1
2 TABLET ORAL EVERY 6 HOURS
Qty: 0 | Refills: 0 | Status: DISCONTINUED | OUTPATIENT
Start: 2018-09-14 | End: 2018-09-17

## 2018-09-14 RX ORDER — ACETAMINOPHEN 500 MG
1000 TABLET ORAL ONCE
Qty: 0 | Refills: 0 | Status: DISCONTINUED | OUTPATIENT
Start: 2018-09-14 | End: 2018-09-14

## 2018-09-14 RX ADMIN — Medication 100 MILLIGRAM(S): at 20:00

## 2018-09-14 RX ADMIN — Medication 30 MILLIGRAM(S): at 23:40

## 2018-09-14 RX ADMIN — Medication 30 MILLIGRAM(S): at 23:55

## 2018-09-14 RX ADMIN — Medication 1000 MILLIUNIT(S)/MIN: at 20:15

## 2018-09-14 RX ADMIN — Medication 30 MILLILITER(S): at 13:05

## 2018-09-14 RX ADMIN — Medication 25 MILLIGRAM(S): at 23:41

## 2018-09-14 RX ADMIN — ONDANSETRON 4 MILLIGRAM(S): 8 TABLET, FILM COATED ORAL at 23:58

## 2018-09-14 RX ADMIN — SODIUM CHLORIDE 2000 MILLILITER(S): 9 INJECTION, SOLUTION INTRAVENOUS at 05:27

## 2018-09-15 LAB
BASOPHILS # BLD AUTO: 0 K/UL — SIGNIFICANT CHANGE UP (ref 0–0.2)
BASOPHILS NFR BLD AUTO: 0.1 % — SIGNIFICANT CHANGE UP (ref 0–2)
EOSINOPHIL # BLD AUTO: 0 K/UL — SIGNIFICANT CHANGE UP (ref 0–0.5)
EOSINOPHIL NFR BLD AUTO: 0.2 % — SIGNIFICANT CHANGE UP (ref 0–6)
FETAL SCREEN: SIGNIFICANT CHANGE UP
HCT VFR BLD CALC: 28.9 % — LOW (ref 37–47)
HGB BLD-MCNC: 9.5 G/DL — LOW (ref 12–16)
LYMPHOCYTES # BLD AUTO: 1.3 K/UL — SIGNIFICANT CHANGE UP (ref 1–4.8)
LYMPHOCYTES # BLD AUTO: 13.6 % — LOW (ref 20–55)
MCHC RBC-ENTMCNC: 31 PG — SIGNIFICANT CHANGE UP (ref 27–31)
MCHC RBC-ENTMCNC: 32.9 G/DL — SIGNIFICANT CHANGE UP (ref 32–36)
MCV RBC AUTO: 94.4 FL — SIGNIFICANT CHANGE UP (ref 81–99)
MONOCYTES # BLD AUTO: 0.6 K/UL — SIGNIFICANT CHANGE UP (ref 0–0.8)
MONOCYTES NFR BLD AUTO: 6.6 % — SIGNIFICANT CHANGE UP (ref 3–10)
NEUTROPHILS # BLD AUTO: 7.7 K/UL — SIGNIFICANT CHANGE UP (ref 1.8–8)
NEUTROPHILS NFR BLD AUTO: 79.2 % — HIGH (ref 37–73)
PLATELET # BLD AUTO: 131 K/UL — LOW (ref 150–400)
RBC # BLD: 3.06 M/UL — LOW (ref 4.4–5.2)
RBC # FLD: 13.6 % — SIGNIFICANT CHANGE UP (ref 11–15.6)
T PALLIDUM AB TITR SER: NEGATIVE — SIGNIFICANT CHANGE UP
WBC # BLD: 9.7 K/UL — SIGNIFICANT CHANGE UP (ref 4.8–10.8)
WBC # FLD AUTO: 9.7 K/UL — SIGNIFICANT CHANGE UP (ref 4.8–10.8)

## 2018-09-15 RX ADMIN — Medication 1 TABLET(S): at 11:24

## 2018-09-15 RX ADMIN — Medication 30 MILLIGRAM(S): at 11:23

## 2018-09-15 RX ADMIN — Medication 600 MILLIGRAM(S): at 20:41

## 2018-09-15 RX ADMIN — Medication 325 MILLIGRAM(S): at 11:24

## 2018-09-15 RX ADMIN — Medication 600 MILLIGRAM(S): at 21:40

## 2018-09-15 RX ADMIN — SODIUM CHLORIDE 125 MILLILITER(S): 9 INJECTION, SOLUTION INTRAVENOUS at 01:41

## 2018-09-15 RX ADMIN — Medication 30 MILLIGRAM(S): at 11:38

## 2018-09-15 NOTE — PROGRESS NOTE ADULT - SUBJECTIVE AND OBJECTIVE BOX
Postpartum Note,  Section  Patient is a 37y s/p primary  for arrest of fetal decent. post-operative day 1.    Subjective:  No acute events overnight. The patient is feeling well.   She is tolerating a diet and denies N/V.    Patient is having normal postpartum bleeding which is decreasing in amount.    She is breastfeeding and the baby is latching on.    Urinating appropriately into harris.   -BM/+flatus.    Physical exam:    Vital Signs Last 24 Hrs  T(C): 37.4 (14 Sep 2018 23:00), Max: 37.4 (14 Sep 2018 23:00)  T(F): 99.3 (14 Sep 2018 23:00), Max: 99.3 (14 Sep 2018 23:00)  HR: 83 (14 Sep 2018 23:00) (74 - 90)  BP: 114/66 (14 Sep 2018 23:00) (90/77 - 124/72)  BP(mean): --  RR: 16 (14 Sep 2018 23:00) (16 - 18)  SpO2: 99% (14 Sep 2018 22:50) (99% - 99%)    Heart: RRR  Lungs: CTABL  Breast: non tender, not engorged   Abdomen: Soft, nontender, no distension, firm uterine fundus, the dressing is removed, the incision is clean dry and intact  Ext: No DVT signs, warm extremities    preop labs                      11.4   5.6   )-----------( 140      ( 13 Sep 2018 21:42 )             35.1         postop labs pending,

## 2018-09-15 NOTE — PROGRESS NOTE ADULT - SUBJECTIVE AND OBJECTIVE BOX
INTERVAL HPI/OVERNIGHT EVENTS:  37y Female s/p labor epidural to C Section on 9/14/18    Vital Signs Last 24 Hrs  T(C): 36.5 (15 Sep 2018 15:50), Max: 37.4 (14 Sep 2018 23:00)  T(F): 97.7 (15 Sep 2018 15:50), Max: 99.3 (14 Sep 2018 23:00)  HR: 75 (15 Sep 2018 15:50) (56 - 90)  BP: 113/65 (15 Sep 2018 15:50) (90/77 - 124/72)  BP(mean): --  RR: 16 (15 Sep 2018 15:50) (16 - 18)  SpO2: 99% (14 Sep 2018 22:50) (99% - 99%)    Patient seen, doing well, no headache, no residual numbness or weakness, no anesthetic complications or complaints noted or reported.

## 2018-09-16 ENCOUNTER — TRANSCRIPTION ENCOUNTER (OUTPATIENT)
Age: 38
End: 2018-09-16

## 2018-09-16 DIAGNOSIS — D50.0 IRON DEFICIENCY ANEMIA SECONDARY TO BLOOD LOSS (CHRONIC): ICD-10-CM

## 2018-09-16 RX ORDER — IBUPROFEN 200 MG
1 TABLET ORAL
Qty: 28 | Refills: 0 | OUTPATIENT
Start: 2018-09-16 | End: 2018-09-22

## 2018-09-16 RX ORDER — DOCUSATE SODIUM 100 MG
1 CAPSULE ORAL
Qty: 28 | Refills: 0 | OUTPATIENT
Start: 2018-09-16 | End: 2018-09-29

## 2018-09-16 RX ADMIN — SIMETHICONE 80 MILLIGRAM(S): 80 TABLET, CHEWABLE ORAL at 12:07

## 2018-09-16 RX ADMIN — Medication 25 MILLIGRAM(S): at 21:26

## 2018-09-16 RX ADMIN — Medication 600 MILLIGRAM(S): at 21:45

## 2018-09-16 RX ADMIN — Medication 1 TABLET(S): at 12:07

## 2018-09-16 RX ADMIN — Medication 325 MILLIGRAM(S): at 12:07

## 2018-09-16 RX ADMIN — Medication 100 MILLIGRAM(S): at 08:30

## 2018-09-16 RX ADMIN — Medication 600 MILLIGRAM(S): at 15:10

## 2018-09-16 RX ADMIN — Medication 600 MILLIGRAM(S): at 20:57

## 2018-09-16 RX ADMIN — Medication 600 MILLIGRAM(S): at 14:17

## 2018-09-16 NOTE — DISCHARGE NOTE OB - MEDICATION SUMMARY - MEDICATIONS TO TAKE
I will START or STAY ON the medications listed below when I get home from the hospital:    ibuprofen 600 mg oral tablet  -- 1 tab(s) by mouth every 6 hours, As needed, Mild Pain (1 - 3)  -- Indication: For mild pain     oxyCODONE-acetaminophen 5 mg-325 mg oral tablet  -- 1 tab(s) by mouth every 6 hours, As Needed -for moderate pain MDD:4 tablets  -- Caution federal law prohibits the transfer of this drug to any person other  than the person for whom it was prescribed.  May cause drowsiness.  Alcohol may intensify this effect.  Use care when operating dangerous machinery.  This prescription cannot be refilled.  This product contains acetaminophen.  Do not use  with any other product containing acetaminophen to prevent possible liver damage.  Using more of this medication than prescribed may cause serious breathing problems.    -- Indication: For moderate pain     docusate sodium 100 mg oral capsule  -- 1 cap(s) by mouth 2 times a day, As needed, Stool Softening  -- Indication: For Constipation I will START or STAY ON the medications listed below when I get home from the hospital:    ibuprofen 600 mg oral tablet  -- 1 tab(s) by mouth every 6 hours, As needed, Mild Pain (1 - 3)  -- Indication: For mild pain    oxyCODONE-acetaminophen 5 mg-325 mg oral tablet  -- 1 tab(s) by mouth every 6 hours, As Needed -for moderate pain MDD:4 tablets  -- Caution federal law prohibits the transfer of this drug to any person other  than the person for whom it was prescribed.  May cause drowsiness.  Alcohol may intensify this effect.  Use care when operating dangerous machinery.  This prescription cannot be refilled.  This product contains acetaminophen.  Do not use  with any other product containing acetaminophen to prevent possible liver damage.  Using more of this medication than prescribed may cause serious breathing problems.    -- Indication: For moderate pain     docusate sodium 100 mg oral capsule  -- 1 cap(s) by mouth 2 times a day, As needed, Stool Softening  -- Indication: For Constipation

## 2018-09-16 NOTE — DISCHARGE NOTE OB - HOSPITAL COURSE
Patient is a 36 y/o female  who experienced  section at 40 2/7 weeks for arrest of descent. Labor course was uncomplicated, delivery was uncomplicated. Postpartum course was unremarkable. Patient was transferred to postpartum floor and monitored. Patient is medically optimized for discharge and is instructed to follow up at [place] in 1-2 weeks for postpartum care. Patient verbalizes understanding and agreement with treatment and follow up plan and is satisfied with her care. At the time of discharge, patient was ambulating independently, tolerating PO intake, voiding and stooling appropriately, passing flatus and pain is well controlled.

## 2018-09-16 NOTE — PROGRESS NOTE ADULT - SUBJECTIVE AND OBJECTIVE BOX
37y y/o now POD#2 s/p primary  at 40wks gestation after arrest of second stage of labor.      No acute overnight events. Pain well controlled.  Patient is ambulating, +voiding, +Flatus, -BM  Reports minimal lochia.   +Breast feeding, -breast tenderness    VS:   Vital Signs Last 24 Hrs  T(C): 36.5 (15 Sep 2018 15:50), Max: 37.2 (15 Sep 2018 09:21)  T(F): 97.7 (15 Sep 2018 15:50), Max: 98.9 (15 Sep 2018 09:21)  HR: 75 (15 Sep 2018 15:50) (69 - 75)  BP: 113/65 (15 Sep 2018 15:50) (111/67 - 113/65)  RR: 16 (15 Sep 2018 15:50) (16 - 16)    Physical Exam:  General: NAD  Abdomen: soft, ND, firm fundus palpated at the umbilicus. Incision clean, dry, and intact.  Ext: nontender lower extremities.     Labs:                        9.5    9.7   )-----------( 131      ( 15 Sep 2018 07:27 )             28.9

## 2018-09-16 NOTE — DISCHARGE NOTE OB - PLAN OF CARE
Recovery, Healthy mother and baby -Recommended early ambulation, adequate hydration and a balanced diet. Mother-baby interaction also encouraged and breastfeeding.  -Pelvic rest x 6 weeks  -Postpartum check in 1-2 weeks at [place] for postpartum care.

## 2018-09-16 NOTE — DISCHARGE NOTE OB - CARE PLAN
Principal Discharge DX:	 delivery delivered  Goal:	Recovery, Healthy mother and baby  Assessment and plan of treatment:	-Recommended early ambulation, adequate hydration and a balanced diet. Mother-baby interaction also encouraged and breastfeeding.  -Pelvic rest x 6 weeks  -Postpartum check in 1-2 weeks at [place] for postpartum care.

## 2018-09-16 NOTE — DISCHARGE NOTE OB - CARE PROVIDER_API CALL
Esther Winter (DO), Obstetrics and Gynecology  [address] [place], [place] [address]  Phone: [phone]  Fax: [phone]

## 2018-09-16 NOTE — DISCHARGE NOTE OB - ADDITIONAL INSTRUCTIONS
-Recommended early ambulation, adequate hydration and a balanced diet. Mother-baby interaction also encouraged and breastfeeding.  -Pelvic rest x 6 weeks  -Postpartum check in 1-2 weeks at [place] for postpartum care.

## 2018-09-16 NOTE — DISCHARGE NOTE OB - PATIENT PORTAL LINK FT
You can access the [facility], offered by [facility], by registering with the following website: [email or web address]

## 2018-09-17 VITALS
DIASTOLIC BLOOD PRESSURE: 68 MMHG | HEART RATE: 65 BPM | TEMPERATURE: 100 F | RESPIRATION RATE: 18 BRPM | SYSTOLIC BLOOD PRESSURE: 105 MMHG

## 2018-09-17 PROCEDURE — 85027 COMPLETE CBC AUTOMATED: CPT

## 2018-09-17 PROCEDURE — G0463: CPT

## 2018-09-17 PROCEDURE — 59050 FETAL MONITOR W/REPORT: CPT

## 2018-09-17 PROCEDURE — 59025 FETAL NON-STRESS TEST: CPT

## 2018-09-17 PROCEDURE — 86850 RBC ANTIBODY SCREEN: CPT

## 2018-09-17 PROCEDURE — 81001 URINALYSIS AUTO W/SCOPE: CPT

## 2018-09-17 PROCEDURE — 82803 BLOOD GASES ANY COMBINATION: CPT

## 2018-09-17 PROCEDURE — 86900 BLOOD TYPING SEROLOGIC ABO: CPT

## 2018-09-17 PROCEDURE — 86880 COOMBS TEST DIRECT: CPT

## 2018-09-17 PROCEDURE — 86870 RBC ANTIBODY IDENTIFICATION: CPT

## 2018-09-17 PROCEDURE — 36415 COLL VENOUS BLD VENIPUNCTURE: CPT

## 2018-09-17 PROCEDURE — 86780 TREPONEMA PALLIDUM: CPT

## 2018-09-17 PROCEDURE — 86901 BLOOD TYPING SEROLOGIC RH(D): CPT

## 2018-09-17 PROCEDURE — 85461 HEMOGLOBIN FETAL: CPT

## 2018-09-17 RX ORDER — HYDROCORTISONE 1 %
1 OINTMENT (GRAM) TOPICAL THREE TIMES A DAY
Qty: 0 | Refills: 0 | Status: DISCONTINUED | OUTPATIENT
Start: 2018-09-17 | End: 2018-09-17

## 2018-09-17 RX ORDER — HYDROCORTISONE 1 %
1 OINTMENT (GRAM) TOPICAL
Qty: 1 | Refills: 1 | OUTPATIENT
Start: 2018-09-17

## 2018-09-17 RX ORDER — IBUPROFEN 200 MG
1 TABLET ORAL
Qty: 28 | Refills: 0
Start: 2018-09-17 | End: 2018-09-23

## 2018-09-17 RX ORDER — DOCUSATE SODIUM 100 MG
1 CAPSULE ORAL
Qty: 28 | Refills: 0 | OUTPATIENT
Start: 2018-09-17 | End: 2018-09-30

## 2018-09-17 RX ADMIN — Medication 1 TABLET(S): at 12:21

## 2018-09-17 RX ADMIN — Medication 600 MILLIGRAM(S): at 07:00

## 2018-09-17 RX ADMIN — Medication 325 MILLIGRAM(S): at 12:22

## 2018-09-17 RX ADMIN — Medication 25 MILLIGRAM(S): at 02:57

## 2018-09-17 RX ADMIN — Medication 600 MILLIGRAM(S): at 06:00

## 2018-09-17 NOTE — PROGRESS NOTE ADULT - ASSESSMENT
Section Day: 3  Patient is feeling well overall.     Continue the current pain medication.   Encourage ambulation and regular diet.    Plan to discharge today according to the normal criteria.    -consider erythema of pregnancy as a differential for the abdominal rash.

## 2018-09-17 NOTE — PROGRESS NOTE ADULT - ATTENDING COMMENTS
Patient seen and examined with me.  No complaints.  Pain well controlled.  Tolerating regular diet, no nausea or vomiting.  ambulating.  Voiding without difficulty.  Passing flatus.  Minimal lochia.  Denies HA, dizziness, CP, SOB, LE pain.  VSS.  Incision c/d/i.  Plan for DC home today.  DC instructions reviewed.  Call parameters reviewed.

## 2018-09-17 NOTE — PROGRESS NOTE ADULT - SUBJECTIVE AND OBJECTIVE BOX
Postpartum Note,  Section  Patient is a 37y  s/p primary  for arrest of fetal decent, post-operative day 3.    Subjective:  No acute events overnight. The patient is feeling well.   She is tolerating a diet and denies N/V.    Patient is having normal postpartum bleeding which is decreasing in amount.    She is breastfeeding and the baby is latching on.    Urinating appropriately.   -BM/+flatus.    Physical exam:    Vital Signs Last 24 Hrs  T(C): 37 (16 Sep 2018 20:04), Max: 37.4 (16 Sep 2018 08:28)  T(F): 98.6 (16 Sep 2018 20:04), Max: 99.4 (16 Sep 2018 08:28)  HR: 86 (16 Sep 2018 20:04) (79 - 86)  BP: 117/73 (16 Sep 2018 20:04) (117/73 - 126/75)  RR: 18 (16 Sep 2018 08:28) (18 - 18)  SpO2: 100% (16 Sep 2018 20:04) (100% - 100%)    Heart: RRR  Lungs: CTABL  Breast: non tender, not engorged   Abdomen: Soft, nontender, no distension, pruritic generalized rash covering the entire abdomen, took benadryl which helped with pruritus, but not the rash. Firm uterine fundus, below umbilicus. the incision is clean dry and intact  Ext: No DVT signs, warm extremities    LABS:                        9.5    9.7   )-----------( 131      ( 15 Sep 2018 07:27 )             28.9

## 2018-09-25 ENCOUNTER — APPOINTMENT (OUTPATIENT)
Dept: OBGYN | Facility: CLINIC | Age: 38
End: 2018-09-25

## 2018-09-27 ENCOUNTER — APPOINTMENT (OUTPATIENT)
Dept: OBGYN | Facility: CLINIC | Age: 38
End: 2018-09-27
Payer: COMMERCIAL

## 2018-09-27 VITALS
HEIGHT: 66 IN | SYSTOLIC BLOOD PRESSURE: 113 MMHG | DIASTOLIC BLOOD PRESSURE: 75 MMHG | WEIGHT: 146 LBS | HEART RATE: 66 BPM | BODY MASS INDEX: 23.46 KG/M2

## 2018-09-27 PROCEDURE — 99213 OFFICE O/P EST LOW 20 MIN: CPT | Mod: 24

## 2018-10-18 ENCOUNTER — APPOINTMENT (OUTPATIENT)
Dept: OBGYN | Facility: CLINIC | Age: 38
End: 2018-10-18
Payer: COMMERCIAL

## 2018-10-18 VITALS
SYSTOLIC BLOOD PRESSURE: 100 MMHG | BODY MASS INDEX: 23.14 KG/M2 | WEIGHT: 144 LBS | HEIGHT: 66 IN | DIASTOLIC BLOOD PRESSURE: 62 MMHG

## 2018-10-18 PROCEDURE — 0503F POSTPARTUM CARE VISIT: CPT

## 2018-11-06 ENCOUNTER — APPOINTMENT (OUTPATIENT)
Dept: OBGYN | Facility: CLINIC | Age: 38
End: 2018-11-06
Payer: COMMERCIAL

## 2018-11-06 VITALS
WEIGHT: 141 LBS | DIASTOLIC BLOOD PRESSURE: 70 MMHG | SYSTOLIC BLOOD PRESSURE: 120 MMHG | BODY MASS INDEX: 22.66 KG/M2 | HEIGHT: 66 IN

## 2018-11-06 PROCEDURE — 0503F POSTPARTUM CARE VISIT: CPT

## 2018-11-21 LAB
CYTOLOGY CVX/VAG DOC THIN PREP: NORMAL
HPV HIGH+LOW RISK DNA PNL CVX: NOT DETECTED

## 2018-11-27 ENCOUNTER — APPOINTMENT (OUTPATIENT)
Dept: OBGYN | Facility: CLINIC | Age: 38
End: 2018-11-27
Payer: COMMERCIAL

## 2018-11-27 VITALS
BODY MASS INDEX: 22.66 KG/M2 | DIASTOLIC BLOOD PRESSURE: 80 MMHG | WEIGHT: 141 LBS | HEIGHT: 66 IN | SYSTOLIC BLOOD PRESSURE: 120 MMHG

## 2018-11-27 PROCEDURE — 81003 URINALYSIS AUTO W/O SCOPE: CPT | Mod: NC,QW

## 2018-11-27 PROCEDURE — 99213 OFFICE O/P EST LOW 20 MIN: CPT

## 2018-11-29 LAB — BACTERIA UR CULT: NORMAL

## 2018-11-30 LAB — BACTERIA GENITAL AEROBE CULT: NORMAL

## 2018-12-04 ENCOUNTER — APPOINTMENT (OUTPATIENT)
Dept: OBGYN | Facility: CLINIC | Age: 38
End: 2018-12-04
Payer: COMMERCIAL

## 2018-12-04 VITALS
DIASTOLIC BLOOD PRESSURE: 83 MMHG | BODY MASS INDEX: 22.66 KG/M2 | SYSTOLIC BLOOD PRESSURE: 117 MMHG | HEART RATE: 75 BPM | HEIGHT: 66 IN | WEIGHT: 141 LBS

## 2018-12-04 LAB
HCG UR QL: NEGATIVE
QUALITY CONTROL: YES

## 2018-12-04 PROCEDURE — 57454 BX/CURETT OF CERVIX W/SCOPE: CPT

## 2018-12-04 PROCEDURE — 81025 URINE PREGNANCY TEST: CPT

## 2018-12-09 LAB — CORE LAB BIOPSY: NORMAL

## 2018-12-21 ENCOUNTER — APPOINTMENT (OUTPATIENT)
Dept: OBGYN | Facility: CLINIC | Age: 38
End: 2018-12-21

## 2019-03-07 ENCOUNTER — TRANSCRIPTION ENCOUNTER (OUTPATIENT)
Age: 39
End: 2019-03-07

## 2019-12-02 ENCOUNTER — APPOINTMENT (OUTPATIENT)
Dept: OBGYN | Facility: CLINIC | Age: 39
End: 2019-12-02

## 2019-12-19 ENCOUNTER — APPOINTMENT (OUTPATIENT)
Dept: OBGYN | Facility: CLINIC | Age: 39
End: 2019-12-19
Payer: COMMERCIAL

## 2019-12-19 ENCOUNTER — ASOB RESULT (OUTPATIENT)
Age: 39
End: 2019-12-19

## 2019-12-19 ENCOUNTER — APPOINTMENT (OUTPATIENT)
Dept: ANTEPARTUM | Facility: CLINIC | Age: 39
End: 2019-12-19
Payer: COMMERCIAL

## 2019-12-19 VITALS
DIASTOLIC BLOOD PRESSURE: 69 MMHG | WEIGHT: 140 LBS | BODY MASS INDEX: 22.5 KG/M2 | SYSTOLIC BLOOD PRESSURE: 106 MMHG | HEIGHT: 66 IN

## 2019-12-19 PROCEDURE — 81025 URINE PREGNANCY TEST: CPT

## 2019-12-19 PROCEDURE — 76817 TRANSVAGINAL US OBSTETRIC: CPT

## 2019-12-19 PROCEDURE — 99213 OFFICE O/P EST LOW 20 MIN: CPT

## 2019-12-23 LAB
HCG UR QL: POSITIVE
QUALITY CONTROL: YES

## 2020-01-09 ENCOUNTER — ASOB RESULT (OUTPATIENT)
Age: 40
End: 2020-01-09

## 2020-01-09 ENCOUNTER — APPOINTMENT (OUTPATIENT)
Dept: OBGYN | Facility: CLINIC | Age: 40
End: 2020-01-09
Payer: COMMERCIAL

## 2020-01-09 ENCOUNTER — APPOINTMENT (OUTPATIENT)
Dept: ANTEPARTUM | Facility: CLINIC | Age: 40
End: 2020-01-09
Payer: COMMERCIAL

## 2020-01-09 VITALS
HEIGHT: 66 IN | BODY MASS INDEX: 22.5 KG/M2 | DIASTOLIC BLOOD PRESSURE: 60 MMHG | SYSTOLIC BLOOD PRESSURE: 104 MMHG | WEIGHT: 140 LBS

## 2020-01-09 PROCEDURE — 76801 OB US < 14 WKS SINGLE FETUS: CPT

## 2020-01-09 PROCEDURE — 0501F PRENATAL FLOW SHEET: CPT

## 2020-01-23 ENCOUNTER — APPOINTMENT (OUTPATIENT)
Dept: MATERNAL FETAL MEDICINE | Facility: CLINIC | Age: 40
End: 2020-01-23
Payer: COMMERCIAL

## 2020-01-23 ENCOUNTER — LABORATORY RESULT (OUTPATIENT)
Age: 40
End: 2020-01-23

## 2020-01-23 ENCOUNTER — APPOINTMENT (OUTPATIENT)
Dept: ANTEPARTUM | Facility: CLINIC | Age: 40
End: 2020-01-23
Payer: COMMERCIAL

## 2020-01-23 ENCOUNTER — ASOB RESULT (OUTPATIENT)
Age: 40
End: 2020-01-23

## 2020-01-23 PROCEDURE — 76813 OB US NUCHAL MEAS 1 GEST: CPT

## 2020-01-23 PROCEDURE — 99241 OFFICE CONSULTATION NEW/ESTAB PATIENT 15 MIN: CPT | Mod: 25

## 2020-01-23 PROCEDURE — 36416 COLLJ CAPILLARY BLOOD SPEC: CPT

## 2020-01-23 PROCEDURE — 76814 OB US NUCHAL MEAS ADD-ON: CPT

## 2020-01-27 LAB
1ST TRIMESTER DATA: NORMAL
ADDENDUM DOC: NORMAL
AFP PNL SERPL: NORMAL
AFP SERPL-ACNC: NORMAL
CLINICAL BIOCHEMIST REVIEW: NORMAL
CLINICAL BIOCHEMIST REVIEW: NORMAL
COMMENTS TWIN B: NORMAL
FREE BETA HCG 1ST TRIMESTER: NORMAL
Lab: NORMAL
NOTES NTD: NORMAL
NOTES TWIN B: NORMAL
NT-TWIN B: NORMAL
NT: NORMAL
PAPP-A SERPL-ACNC: NORMAL
RECOMMENDATIONS TWIN B: NORMAL
TRISOMY 18/13 TWIN B: NORMAL
TRISOMY 18/3: NORMAL

## 2020-02-03 ENCOUNTER — APPOINTMENT (OUTPATIENT)
Dept: ANTEPARTUM | Facility: CLINIC | Age: 40
End: 2020-02-03

## 2020-02-03 ENCOUNTER — APPOINTMENT (OUTPATIENT)
Dept: MATERNAL FETAL MEDICINE | Facility: CLINIC | Age: 40
End: 2020-02-03
Payer: COMMERCIAL

## 2020-02-03 VITALS
RESPIRATION RATE: 18 BRPM | HEART RATE: 97 BPM | HEIGHT: 66 IN | OXYGEN SATURATION: 98 % | WEIGHT: 140 LBS | DIASTOLIC BLOOD PRESSURE: 58 MMHG | SYSTOLIC BLOOD PRESSURE: 100 MMHG | BODY MASS INDEX: 22.5 KG/M2

## 2020-02-03 PROCEDURE — 99244 OFF/OP CNSLTJ NEW/EST MOD 40: CPT

## 2020-02-03 PROCEDURE — 99214 OFFICE O/P EST MOD 30 MIN: CPT

## 2020-02-03 RX ORDER — CEPHALEXIN 500 MG/1
500 CAPSULE ORAL 4 TIMES DAILY
Qty: 28 | Refills: 0 | Status: DISCONTINUED | COMMUNITY
Start: 2018-09-27 | End: 2020-02-03

## 2020-02-03 RX ORDER — MISOPROSTOL 100 UG/1
100 TABLET ORAL
Qty: 3 | Refills: 0 | Status: DISCONTINUED | COMMUNITY
Start: 2018-11-06 | End: 2020-02-03

## 2020-02-03 NOTE — PAST MEDICAL HISTORY
[Chlamydial Infections] : no chlamydia [HIV Infection] : no HIV [Exposure To Gonorrhea] : no gonorrhea [Syphilis] : no syphilis [Herpes Simplex] : no genital herpes [Hepatitis, B Virus] : no Hepatitis B [Human Papilloma Virus Infection] : no genital warts [Hepatitis, C Virus] : no Hepatitis C [Trichomoniasis] : no trichomoniasis

## 2020-02-03 NOTE — DATA REVIEWED
[FreeTextEntry1] : An ultrascreen was perfomed last week, and the results were reviewed with her today. Both twins came back with low risks, and normal NT measurement. \par \par We discussed the difference in this pregnancy compared to her gaviria pregnancy last year.. She did go to full term last pregnancy and we discussed the increased risks of  delivery as well as issues unique to multiples. She understands the increased incidence of GDM and preeclampsia, and the need for increased surveillance in the third trimester.\par \par All her questions regarding the upcoming months were answered.

## 2020-02-03 NOTE — FAMILY HISTORY
[Reported Family History Of Birth Defects] : no congenital heart defects [Tapan-Sachs Carrier] : no Tapan-Sachs [Family History] : no mental retardation/autism [Reported Family History Of Genetic Disease] : no history of child defect in child of baby father

## 2020-02-03 NOTE — VITALS
[LMP (date): ___] : LMP was on [unfilled] [GA =___ Weeks] : which calculates to a GA of [unfilled] weeks [GA= ___ Days] : and [unfilled] day(s) [JACOBO by LMP (date): ___] : The calculated JACOBO by LMP is [unfilled] [By LMP] : this is the final JACOBO

## 2020-02-03 NOTE — SURGICAL HISTORY
[Fibroids] : no fibroids [Abn Paps] : abnormal pap smear [Breast Disease] : no breast disease [STI's] : no STI's [Cysts] : no cysts [Infertility] : no infertility [OC Use] : no OC use [Last Pap: ___] : Last Pap: [unfilled] [Last Mammo: ___] : Last Mammo: none

## 2020-02-03 NOTE — OB HISTORY
[Pregnancy History] : patient received anesthesia [___] : no pregnancy complications reported [LMP: ___] : LMP: [unfilled] [JACOBO: ___] : JACOBO: [unfilled] [EGA: ___ wks] : EGA: [unfilled] wks [Spontaneous] : Spontaneous conception [Sonogram] : sonogram [at ___ wks] : at [unfilled] weeks [FreeTextEntry1] : [facility] due to di/di twin gestation and AMA.  Pt states she has seen [facility] on 1/23/20 and had NIPS done then, no results as of yet.  Pt has a hx of an LEEP procedure in 2017.  No leaking or cramping.  ++ FHR X2. [Definite:  ___ (Date)] : the last menstrual period was [unfilled] [Normal Amount/Duration] : was of a normal amount and duration [Spotting Between  Menses] : no spotting between menses [Regular Cycle Intervals] : periods have been regular

## 2020-02-03 NOTE — DISCUSSION/SUMMARY
[FreeTextEntry1] : Level 2 sonogram at 20 weeks. \par \par Growth scan every 4 weeks. \par \par Weekly fetal testing to begin at 30 weeks. \par \par GCT at 24-26 weeks.

## 2020-02-07 ENCOUNTER — APPOINTMENT (OUTPATIENT)
Dept: OBGYN | Facility: CLINIC | Age: 40
End: 2020-02-07
Payer: COMMERCIAL

## 2020-02-07 VITALS
BODY MASS INDEX: 23.14 KG/M2 | WEIGHT: 144 LBS | SYSTOLIC BLOOD PRESSURE: 100 MMHG | DIASTOLIC BLOOD PRESSURE: 60 MMHG | HEIGHT: 66 IN

## 2020-02-07 PROCEDURE — 0502F SUBSEQUENT PRENATAL CARE: CPT

## 2020-02-21 ENCOUNTER — APPOINTMENT (OUTPATIENT)
Dept: ANTEPARTUM | Facility: CLINIC | Age: 40
End: 2020-02-21

## 2020-02-24 LAB
1ST TRIMESTER DATA: NORMAL
2ND TRIMESTER DATA: NORMAL
ADDENDUM DOC: NORMAL
AFP PNL SERPL: NORMAL
AFP SERPL-ACNC: NORMAL
AFP SERPL-ACNC: NORMAL
B-HCG FREE SERPL-MCNC: NORMAL
CLINICAL BIOCHEMIST REVIEW: NORMAL
COMMENTS TWIN B: NORMAL
FREE BETA HCG 1ST TRIMESTER: NORMAL
INHIBIN A SERPL-MCNC: NORMAL
NOTES NTD: NORMAL
NOTES TWIN B: NORMAL
NT-TWIN B: NORMAL
NT: NORMAL
PAPP-A SERPL-ACNC: NORMAL
TOTAL ONTDS/VWD TWIN B: NORMAL
U ESTRIOL SERPL-SCNC: NORMAL

## 2020-03-09 ENCOUNTER — APPOINTMENT (OUTPATIENT)
Dept: OBGYN | Facility: CLINIC | Age: 40
End: 2020-03-09
Payer: COMMERCIAL

## 2020-03-09 VITALS
HEIGHT: 66 IN | WEIGHT: 152 LBS | HEART RATE: 80 BPM | SYSTOLIC BLOOD PRESSURE: 105 MMHG | DIASTOLIC BLOOD PRESSURE: 68 MMHG | BODY MASS INDEX: 24.43 KG/M2

## 2020-03-09 PROCEDURE — 0502F SUBSEQUENT PRENATAL CARE: CPT

## 2020-03-23 ENCOUNTER — APPOINTMENT (OUTPATIENT)
Dept: ANTEPARTUM | Facility: CLINIC | Age: 40
End: 2020-03-23
Payer: COMMERCIAL

## 2020-03-23 ENCOUNTER — ASOB RESULT (OUTPATIENT)
Age: 40
End: 2020-03-23

## 2020-03-23 PROCEDURE — 76812 OB US DETAILED ADDL FETUS: CPT

## 2020-03-23 PROCEDURE — 76817 TRANSVAGINAL US OBSTETRIC: CPT

## 2020-03-23 PROCEDURE — 76811 OB US DETAILED SNGL FETUS: CPT | Mod: 59

## 2020-04-06 ENCOUNTER — APPOINTMENT (OUTPATIENT)
Dept: OBGYN | Facility: CLINIC | Age: 40
End: 2020-04-06
Payer: COMMERCIAL

## 2020-04-06 VITALS
HEIGHT: 66 IN | BODY MASS INDEX: 25.39 KG/M2 | DIASTOLIC BLOOD PRESSURE: 70 MMHG | WEIGHT: 158 LBS | SYSTOLIC BLOOD PRESSURE: 110 MMHG

## 2020-04-06 PROCEDURE — 0502F SUBSEQUENT PRENATAL CARE: CPT

## 2020-04-06 RX ORDER — HUMAN RHO(D) IMMUNE GLOBULIN 300 UG/1
1500 INJECTION, SOLUTION INTRAMUSCULAR
Qty: 1 | Refills: 0 | Status: ACTIVE | COMMUNITY
Start: 2020-04-06 | End: 1900-01-01

## 2020-04-21 ENCOUNTER — ASOB RESULT (OUTPATIENT)
Age: 40
End: 2020-04-21

## 2020-04-21 ENCOUNTER — APPOINTMENT (OUTPATIENT)
Dept: ANTEPARTUM | Facility: CLINIC | Age: 40
End: 2020-04-21
Payer: COMMERCIAL

## 2020-04-21 VITALS — TEMPERATURE: 98.3 F

## 2020-04-21 PROCEDURE — 76816 OB US FOLLOW-UP PER FETUS: CPT | Mod: 59

## 2020-04-30 ENCOUNTER — LABORATORY RESULT (OUTPATIENT)
Age: 40
End: 2020-04-30

## 2020-05-18 ENCOUNTER — APPOINTMENT (OUTPATIENT)
Dept: ANTEPARTUM | Facility: CLINIC | Age: 40
End: 2020-05-18
Payer: COMMERCIAL

## 2020-05-18 ENCOUNTER — APPOINTMENT (OUTPATIENT)
Dept: OBGYN | Facility: CLINIC | Age: 40
End: 2020-05-18
Payer: COMMERCIAL

## 2020-05-18 ENCOUNTER — ASOB RESULT (OUTPATIENT)
Age: 40
End: 2020-05-18

## 2020-05-18 VITALS
BODY MASS INDEX: 26.84 KG/M2 | DIASTOLIC BLOOD PRESSURE: 70 MMHG | SYSTOLIC BLOOD PRESSURE: 110 MMHG | WEIGHT: 167 LBS | HEIGHT: 66 IN

## 2020-05-18 PROCEDURE — 0502F SUBSEQUENT PRENATAL CARE: CPT

## 2020-05-18 PROCEDURE — 76816 OB US FOLLOW-UP PER FETUS: CPT | Mod: 59

## 2020-05-18 RX ORDER — HUMAN RHO(D) IMMUNE GLOBULIN 300 UG/1
1500 INJECTION, SOLUTION INTRAMUSCULAR
Refills: 0 | Status: COMPLETED | OUTPATIENT
Start: 2020-05-18

## 2020-05-18 RX ADMIN — HUMAN RHO(D) IMMUNE GLOBULIN 0 UNIT: 300 INJECTION, SOLUTION INTRAMUSCULAR at 00:00

## 2020-05-21 ENCOUNTER — APPOINTMENT (OUTPATIENT)
Dept: ANTEPARTUM | Facility: CLINIC | Age: 40
End: 2020-05-21

## 2020-06-18 ENCOUNTER — ASOB RESULT (OUTPATIENT)
Age: 40
End: 2020-06-18

## 2020-06-18 ENCOUNTER — APPOINTMENT (OUTPATIENT)
Dept: OBGYN | Facility: CLINIC | Age: 40
End: 2020-06-18
Payer: COMMERCIAL

## 2020-06-18 ENCOUNTER — APPOINTMENT (OUTPATIENT)
Dept: ANTEPARTUM | Facility: CLINIC | Age: 40
End: 2020-06-18
Payer: COMMERCIAL

## 2020-06-18 VITALS
HEIGHT: 66 IN | WEIGHT: 175 LBS | DIASTOLIC BLOOD PRESSURE: 71 MMHG | SYSTOLIC BLOOD PRESSURE: 111 MMHG | BODY MASS INDEX: 28.12 KG/M2

## 2020-06-18 PROCEDURE — 76816 OB US FOLLOW-UP PER FETUS: CPT

## 2020-06-18 PROCEDURE — 0502F SUBSEQUENT PRENATAL CARE: CPT

## 2020-06-18 PROCEDURE — 90471 IMMUNIZATION ADMIN: CPT

## 2020-06-18 PROCEDURE — 90715 TDAP VACCINE 7 YRS/> IM: CPT

## 2020-06-23 ENCOUNTER — APPOINTMENT (OUTPATIENT)
Dept: OBGYN | Facility: CLINIC | Age: 40
End: 2020-06-23
Payer: COMMERCIAL

## 2020-06-23 ENCOUNTER — ASOB RESULT (OUTPATIENT)
Age: 40
End: 2020-06-23

## 2020-06-23 ENCOUNTER — APPOINTMENT (OUTPATIENT)
Dept: ANTEPARTUM | Facility: CLINIC | Age: 40
End: 2020-06-23
Payer: COMMERCIAL

## 2020-06-23 VITALS
SYSTOLIC BLOOD PRESSURE: 100 MMHG | WEIGHT: 177 LBS | DIASTOLIC BLOOD PRESSURE: 66 MMHG | BODY MASS INDEX: 28.45 KG/M2 | HEIGHT: 66 IN

## 2020-06-23 PROCEDURE — 0502F SUBSEQUENT PRENATAL CARE: CPT

## 2020-06-23 PROCEDURE — 59025 FETAL NON-STRESS TEST: CPT

## 2020-06-23 PROCEDURE — 76819 FETAL BIOPHYS PROFIL W/O NST: CPT | Mod: 59

## 2020-06-24 VITALS — WEIGHT: 177 LBS | BODY MASS INDEX: 28.45 KG/M2 | HEIGHT: 66 IN

## 2020-06-25 LAB
APTT BLD: 28.3 SEC
INR PPP: 0.95 RATIO
PT BLD: 10.8 SEC

## 2020-06-29 ENCOUNTER — APPOINTMENT (OUTPATIENT)
Dept: MATERNAL FETAL MEDICINE | Facility: CLINIC | Age: 40
End: 2020-06-29
Payer: COMMERCIAL

## 2020-06-29 PROCEDURE — 99214 OFFICE O/P EST MOD 30 MIN: CPT | Mod: 95

## 2020-06-29 NOTE — HISTORY OF PRESENT ILLNESS
[FreeTextEntry1] : Namrata presents due to falling in the high risk category for obstetrical hemorrhage

## 2020-06-29 NOTE — DISCUSSION/SUMMARY
[FreeTextEntry1] : Weekly fetal testing\par \par Delivery is scheduled for repeat . \par \par Transexamic acid (TXA) has been demonstrated to be beneficial in women with a post partum hemorrhage and tehre is a potential benefit to have available prophylactically in women with a high risk of hemorrhage. \par \par Having the TXA available at delivery would be advisable in case of need.

## 2020-06-29 NOTE — DATA REVIEWED
[FreeTextEntry1] : Namrata has bheen well known to our service due to the Di/Di twins. Her most recent sonogram was on  and shows BPP of 8/, and the last growth scan was on  showing both twins to be AGA and concordant. \par \par Given the multiple gestation and prior , there two medium risk factors place her in the high risk factor category. \par \par I discussed these findings with namrata, especially in light of her Hct of 33.6%.\par \par While there are no further risk factors noted, close monitoring for additional concerns such as polyhydramnios or a dropping platelet count below 100,000 is advised. \par \par

## 2020-07-02 ENCOUNTER — APPOINTMENT (OUTPATIENT)
Dept: OBGYN | Facility: CLINIC | Age: 40
End: 2020-07-02
Payer: COMMERCIAL

## 2020-07-02 ENCOUNTER — APPOINTMENT (OUTPATIENT)
Dept: ANTEPARTUM | Facility: CLINIC | Age: 40
End: 2020-07-02
Payer: COMMERCIAL

## 2020-07-02 ENCOUNTER — ASOB RESULT (OUTPATIENT)
Age: 40
End: 2020-07-02

## 2020-07-02 VITALS
BODY MASS INDEX: 28.93 KG/M2 | HEIGHT: 66 IN | SYSTOLIC BLOOD PRESSURE: 115 MMHG | HEART RATE: 71 BPM | DIASTOLIC BLOOD PRESSURE: 73 MMHG | WEIGHT: 180 LBS

## 2020-07-02 PROCEDURE — 76819 FETAL BIOPHYS PROFIL W/O NST: CPT

## 2020-07-02 PROCEDURE — 0502F SUBSEQUENT PRENATAL CARE: CPT

## 2020-07-02 PROCEDURE — 59025 FETAL NON-STRESS TEST: CPT

## 2020-07-04 LAB
GP B STREP DNA SPEC QL NAA+PROBE: NORMAL
GP B STREP DNA SPEC QL NAA+PROBE: NOT DETECTED
SOURCE GBS: NORMAL

## 2020-07-06 ENCOUNTER — APPOINTMENT (OUTPATIENT)
Dept: ANTEPARTUM | Facility: CLINIC | Age: 40
End: 2020-07-06

## 2020-07-06 ENCOUNTER — APPOINTMENT (OUTPATIENT)
Dept: MATERNAL FETAL MEDICINE | Facility: CLINIC | Age: 40
End: 2020-07-06
Payer: COMMERCIAL

## 2020-07-06 VITALS
OXYGEN SATURATION: 98 % | DIASTOLIC BLOOD PRESSURE: 66 MMHG | BODY MASS INDEX: 29.11 KG/M2 | RESPIRATION RATE: 18 BRPM | HEART RATE: 71 BPM | SYSTOLIC BLOOD PRESSURE: 106 MMHG | WEIGHT: 181.13 LBS | HEIGHT: 66 IN

## 2020-07-06 VITALS
WEIGHT: 181.13 LBS | BODY MASS INDEX: 29.11 KG/M2 | DIASTOLIC BLOOD PRESSURE: 66 MMHG | HEART RATE: 71 BPM | HEIGHT: 66 IN | OXYGEN SATURATION: 98 % | RESPIRATION RATE: 18 BRPM | SYSTOLIC BLOOD PRESSURE: 106 MMHG

## 2020-07-06 LAB — BILE AC SER-MCNC: 6.1 UMOL/L

## 2020-07-06 PROCEDURE — 99214 OFFICE O/P EST MOD 30 MIN: CPT

## 2020-07-06 RX ORDER — HYDROXYZINE HYDROCHLORIDE 25 MG/1
25 TABLET ORAL 3 TIMES DAILY
Qty: 30 | Refills: 1 | Status: ACTIVE | COMMUNITY
Start: 2020-07-06 | End: 1900-01-01

## 2020-07-06 NOTE — CHIEF COMPLAINT
[G ___] : G [unfilled] [P ___] : P [unfilled] [de-identified] :  suspected intrahepatic cholestasis and dichorionic diamniotic twin pregnancy

## 2020-07-06 NOTE — DISCUSSION/SUMMARY
[FreeTextEntry1] : She is 35 weeks and 4 days gestation by last menstrual period dates.\par \par She states that she has been having itching in the hands, feet, legs, and arms mostly at night. She told me that steroid skin medication does not relieve the itching. She states that her skin has not  been dry during the pregnancy. She uses daily moisturizing lotion. She had liver function tests and total bile acids done on 20. The liver function tests were WNL (AST 21, ALT 11). I told her that the non-fasting elevated bile acid levels done on 20 were not diagnostic of intrahepatic cholestasis since the test was not done in a fasting state. She had a fasting bile acids level done on 20 that was reported to be WNL (6.1 umol/L). I told her that at this time the laboratory tests results are not consistent with having intrahepatic cholestasis. I believe she has pruritus of pregnancy.  I prescribed hydroxyzine 25 mg 3 times daily for body itching. She was advised to use a moisturizing soap bar or pH-balanced body washes to cleanse the skin. She was also advised to apply calamine lotion to her skin, and take Aveeno (oatmeal) bath treatments to help relieve the itching. She was advised to avoid hot or warm showers and baths. I told her to avoid cleansing products that contain strong detergents or perfumes that can make the skin dry or irritated. She was advised to have twice weekly fetal testing until delivery to evaluate fetal well being. She was advised to have a followup [facility] visit in approximately 2 weeks.\par \par She had a prior  section delivery. She was informed of the risks and benefits of a trial of labor. She was informed of the risk of uterine rupture and the associated maternal complications such as hysterectomy, blood transfusions, and intensive care unit admission. She was also informed of the associated  adverse outcomes in cases of uterine rupture such as stillbirth, fetal hypoxia and neurological impairment (cerebral palsy). She expressed a desire to have a repeat  section birth with the current twin pregnancy.\par \par Regarding the twin pregnancy, I told her that twin pregnancies are at risk for discordant fetal growth, and intrauterine growth restriction. I told her that twin pregnancies are also at increased risk for developing preeclampsia, gestational diabetes, urinary tract infections, and postpartum hemorrhage. \par \par \par \par

## 2020-07-06 NOTE — SURGICAL HISTORY
[Abn Paps] : abnormal pap smear [Last Pap: ___] : Last Pap: [unfilled] [Fibroids] : no fibroids [STI's] : no STI's [Breast Disease] : no breast disease [Infertility] : no infertility [Cysts] : no cysts [OC Use] : no OC use [Last Mammo: ___] : Last Mammo: none

## 2020-07-06 NOTE — OB HISTORY
[Pregnancy History] : patient received anesthesia [LMP: ___] : LMP: [unfilled] [JACOBO: ___] : JACOBO: [unfilled] [EGA: ___ wks] : EGA: [unfilled] wks [Sonogram] : sonogram [Spontaneous] : Spontaneous conception [at ___ wks] : at [unfilled] weeks [Definite:  ___ (Date)] : the last menstrual period was [unfilled] [Normal Amount/Duration] : was of a normal amount and duration [Regular Cycle Intervals] : periods have been regular [___] : no pregnancy complications reported [FreeTextEntry1] : She had a genetic counseling visits on 1/23/20 for advanced maternal age and twin pregnancy.  She declined prenatal diagnostic testing. She had NIPS testing  reported low risk for fetal chromosomal malformations. \par \par She had a maternal fetal medicine consultation on February 3, 2020 because of her advanced maternal age and dichorionic diamniotic twin pregnancy. She had a followup maternal fetal medicine consultation on June 29, 2020 to evaluate  her risk for having postpartum hemorrhage.  [Spotting Between  Menses] : no spotting between menses

## 2020-07-06 NOTE — VITALS
[LMP (date): ___] : LMP was on [unfilled] [GA =___ Weeks] : which calculates to a GA of [unfilled] weeks [GA= ___ Days] : and [unfilled] day(s) [By LMP] : this is the final JACOBO [JACOBO by LMP (date): ___] : The calculated JACOBO by LMP is [unfilled]

## 2020-07-06 NOTE — ACTIVE PROBLEMS
[Diabetes Mellitus] : no diabetes mellitus [Hypertension] : no hypertension [Heart Disease] : no heart disease [Autoimmune Disease] : no autoimmune disease [Renal Disease] : no kidney disease, no UTI [Neurologic Disorder] : no neurologic disorder, no epilepsy [Psychiatric Disorders] : no psychiatric disorders [Depression] : no depression, no post partum depression [Hepatic Disorder] : no hepatitis, no liver disease [Thrombophlebitis] : no varicosities, no phlebitis [Thyroid Disorder] : no thyroid dysfunction [Blood Transfusion (___ Ml)] : no history of blood transfusion [Trauma] : no trauma/violence

## 2020-07-09 ENCOUNTER — ASOB RESULT (OUTPATIENT)
Age: 40
End: 2020-07-09

## 2020-07-09 ENCOUNTER — APPOINTMENT (OUTPATIENT)
Dept: OBGYN | Facility: CLINIC | Age: 40
End: 2020-07-09
Payer: COMMERCIAL

## 2020-07-09 ENCOUNTER — APPOINTMENT (OUTPATIENT)
Dept: ANTEPARTUM | Facility: CLINIC | Age: 40
End: 2020-07-09
Payer: COMMERCIAL

## 2020-07-09 VITALS
HEIGHT: 66 IN | SYSTOLIC BLOOD PRESSURE: 117 MMHG | BODY MASS INDEX: 29.41 KG/M2 | HEART RATE: 70 BPM | WEIGHT: 183 LBS | DIASTOLIC BLOOD PRESSURE: 74 MMHG

## 2020-07-09 PROCEDURE — 0502F SUBSEQUENT PRENATAL CARE: CPT

## 2020-07-09 PROCEDURE — 76819 FETAL BIOPHYS PROFIL W/O NST: CPT

## 2020-07-10 LAB
ALBUMIN SERPL ELPH-MCNC: 2.8 G/DL
ALP BLD-CCNC: 140 U/L
ALT SERPL-CCNC: 11 U/L
ANION GAP SERPL CALC-SCNC: 10 MMOL/L
AST SERPL-CCNC: 21 U/L
BASOPHILS # BLD AUTO: 0.03 K/UL
BASOPHILS NFR BLD AUTO: 0.6 %
BILE AC SER-MCNC: 12.7 UMOL/L
BILIRUB SERPL-MCNC: 0.2 MG/DL
BUN SERPL-MCNC: 9 MG/DL
CALCIUM SERPL-MCNC: 8.2 MG/DL
CHLORIDE SERPL-SCNC: 104 MMOL/L
CO2 SERPL-SCNC: 24 MMOL/L
CREAT SERPL-MCNC: 0.87 MG/DL
EOSINOPHIL # BLD AUTO: 0.07 K/UL
EOSINOPHIL NFR BLD AUTO: 1.4 %
FIBRINOGEN PPP COAG.DERIVED-MCNC: 529 MG/DL
GLUCOSE SERPL-MCNC: 110 MG/DL
HCT VFR BLD CALC: 33.6 %
HGB BLD-MCNC: 11.1 G/DL
IMM GRANULOCYTES NFR BLD AUTO: 1 %
LYMPHOCYTES # BLD AUTO: 1.27 K/UL
LYMPHOCYTES NFR BLD AUTO: 25.5 %
MAN DIFF?: NORMAL
MCHC RBC-ENTMCNC: 31.3 PG
MCHC RBC-ENTMCNC: 33 GM/DL
MCV RBC AUTO: 94.6 FL
MONOCYTES # BLD AUTO: 0.44 K/UL
MONOCYTES NFR BLD AUTO: 8.8 %
NEUTROPHILS # BLD AUTO: 3.12 K/UL
NEUTROPHILS NFR BLD AUTO: 62.7 %
PLATELET # BLD AUTO: 126 K/UL
POTASSIUM SERPL-SCNC: 4.3 MMOL/L
PROT SERPL-MCNC: 4.7 G/DL
RBC # BLD: 3.55 M/UL
RBC # FLD: 12.1 %
SODIUM SERPL-SCNC: 138 MMOL/L
URATE SERPL-MCNC: 6.1 MG/DL
WBC # FLD AUTO: 4.98 K/UL

## 2020-07-13 ENCOUNTER — ASOB RESULT (OUTPATIENT)
Age: 40
End: 2020-07-13

## 2020-07-13 ENCOUNTER — APPOINTMENT (OUTPATIENT)
Dept: OBGYN | Facility: CLINIC | Age: 40
End: 2020-07-13
Payer: COMMERCIAL

## 2020-07-13 ENCOUNTER — APPOINTMENT (OUTPATIENT)
Dept: ANTEPARTUM | Facility: CLINIC | Age: 40
End: 2020-07-13
Payer: COMMERCIAL

## 2020-07-13 VITALS
DIASTOLIC BLOOD PRESSURE: 88 MMHG | BODY MASS INDEX: 29.57 KG/M2 | HEIGHT: 66 IN | SYSTOLIC BLOOD PRESSURE: 135 MMHG | WEIGHT: 184 LBS | HEART RATE: 67 BPM

## 2020-07-13 PROCEDURE — 76819 FETAL BIOPHYS PROFIL W/O NST: CPT | Mod: 59

## 2020-07-13 PROCEDURE — 59025 FETAL NON-STRESS TEST: CPT | Mod: 59

## 2020-07-13 PROCEDURE — 0502F SUBSEQUENT PRENATAL CARE: CPT

## 2020-07-16 ENCOUNTER — TRANSCRIPTION ENCOUNTER (OUTPATIENT)
Age: 40
End: 2020-07-16

## 2020-07-16 ENCOUNTER — APPOINTMENT (OUTPATIENT)
Dept: ANTEPARTUM | Facility: CLINIC | Age: 40
End: 2020-07-16
Payer: COMMERCIAL

## 2020-07-16 ENCOUNTER — ASOB RESULT (OUTPATIENT)
Age: 40
End: 2020-07-16

## 2020-07-16 LAB
BILE AC SER-MCNC: 14.7 UMOL/L
HIV1+2 AB SPEC QL IA.RAPID: NONREACTIVE

## 2020-07-16 PROCEDURE — 76818 FETAL BIOPHYS PROFILE W/NST: CPT | Mod: 59

## 2020-07-16 PROCEDURE — 76816 OB US FOLLOW-UP PER FETUS: CPT | Mod: 59

## 2020-07-16 PROCEDURE — 76818 FETAL BIOPHYS PROFILE W/NST: CPT

## 2020-07-16 RX ORDER — OXYTOCIN 10 UNIT/ML
333.33 VIAL (ML) INJECTION
Qty: 20 | Refills: 0 | Status: DISCONTINUED | OUTPATIENT
Start: 2020-07-17 | End: 2020-07-19

## 2020-07-16 RX ORDER — URSODIOL 300 MG/1
300 CAPSULE ORAL
Qty: 30 | Refills: 1 | Status: ACTIVE | COMMUNITY
Start: 2020-07-16 | End: 1900-01-01

## 2020-07-16 NOTE — OB PROVIDER H&P - ATTENDING COMMENTS
Patient seen and examined with me.  Agree with details of resident note.   at 37 weeks with di/di twins and cholestasis of pregnancy.  Plan for repeat CS today.  Informed consent obtained.  NICU, constanza and anesthesia aware.

## 2020-07-16 NOTE — OB PROVIDER H&P - HISTORY OF PRESENT ILLNESS
Patient is a 39 year old  at 37.1 weeks by LMP consistent with a 10 weeks sono who presents to L&D for a scheduled repeat CS in the setting of di/di twins and cholestasis of pregnancy.   JACOBO: 20   LMP: 10/31/19   Pregnancy course is significant for:   1. Di/di twins: Growth discordance of 13%   2. AMA: NIPS negative   3. Cholestasis of pregnancy: Bile acids elevated to 14.7 on , LFTs (AST 21, ALT 11) WNL on    4. Rh negative: Rhogam given on ??   5. H/o LEEP   Obhx:   G1 2018, FT, pCS for FTP, 6lb 12oz, uncx   PGYN: -fibroids/-cysts/-STD hx, +abnormal PAP s/p LEEP   Pmhx: HPV  Pshx: CS (), LEEP, tonsillectomy   Meds: PNVs, vit B12, hydroxyzine 25mg TID   Allergies: erythromycin, PCN   BMI: 22.76   Ultrasound: AA vertex, posterior placenta; BB vertex, posterior placenta ()   EFW: AA 3111g (58%); BB 2717g (22%)     HIV: NR  RPR: NR  HepB: NR  Rubella: Immune  GBS: Neg  ABO: A- Patient is a 39 year old  at 37.1 weeks by LMP consistent with a 10 weeks sono who presents to L&D for a scheduled repeat CS in the setting of di/di twins and cholestasis of pregnancy.   JACOBO: 22   LMP: 10/31/19   Pregnancy course is significant for:   1. Di/di twins: Growth discordance of 13%   2. AMA: NIPS negative   3. Cholestasis of pregnancy: Bile acids elevated to 14.7 on , LFTs (AST 21, ALT 11) WNL on    4. Rh negative  5. H/o LEEP   Obhx:   G1 2018, FT, pCS for FTP, 6lb 12oz, uncx   PGYN:  hx, +abnormal PAP s/p LEEP   Pmhx: HPV  Pshx: CS (), LEEP, tonsillectomy   Meds: PNVs, vit B12, hydroxyzine 25mg TID   Allergies: erythromycin, PCN   BMI: 22.76   Ultrasound: AA vertex, posterior placenta; BB vertex, posterior placenta ()   EFW: AA 3111g (58%); BB 2717g (22%)     HIV: NR  RPR: NR  HepB: NR  Rubella: Immune  GBS: Neg  ABO: A-

## 2020-07-16 NOTE — OB PROVIDER H&P - ASSESSMENT
40 yo  here at 37.1 wks GA for repeat  section for twin gestation.   - admit  - gent + clinda   - consent  - FHT reactive 40 yo  here at 37.1 wks GA for repeat  section for twin gestation.   - admit for repeat CS  - gent + clinda for prophylaxis  - consent  - FHT reactive

## 2020-07-16 NOTE — OB PROVIDER H&P - PSH
delivery delivered    Encounter for IUD insertion   implanted  H/O LEEP    History of tonsillectomy    Knee problem   and 2016 right knee

## 2020-07-16 NOTE — OB PROVIDER H&P - NSHPPHYSICALEXAM_GEN_ALL_CORE
ICU Vital Signs Last 24 Hrs  T(C): 36.6 (17 Jul 2020 06:47), Max: 36.6 (17 Jul 2020 06:47)  T(F): 97.9 (17 Jul 2020 06:47), Max: 97.9 (17 Jul 2020 06:47)  HR: 63 (17 Jul 2020 08:00) (63 - 63)  BP: 140/83 (17 Jul 2020 08:00) (140/83 - 140/83)  RR: 16 (17 Jul 2020 06:47) (16 - 16)      FHT A: 120/mod dorothy  FHT B: 120/ mod dorothy  [place]: none ICU Vital Signs Last 24 Hrs  T(C): 36.6 (17 Jul 2020 06:47), Max: 36.6 (17 Jul 2020 06:47)  T(F): 97.9 (17 Jul 2020 06:47), Max: 97.9 (17 Jul 2020 06:47)  HR: 63 (17 Jul 2020 08:00) (63 - 63)  BP: 140/83 (17 Jul 2020 08:00) (140/83 - 140/83)  RR: 16 (17 Jul 2020 06:47) (16 - 16)      FHT A: 130/mod dorothy  FHT B: 130/ mod dorothy  Lupus: none

## 2020-07-17 ENCOUNTER — INPATIENT (INPATIENT)
Facility: HOSPITAL | Age: 40
LOS: 1 days | Discharge: HOME | End: 2020-07-19
Attending: OBSTETRICS & GYNECOLOGY | Admitting: OBSTETRICS & GYNECOLOGY
Payer: COMMERCIAL

## 2020-07-17 ENCOUNTER — TRANSCRIPTION ENCOUNTER (OUTPATIENT)
Age: 40
End: 2020-07-17

## 2020-07-17 ENCOUNTER — RESULT REVIEW (OUTPATIENT)
Age: 40
End: 2020-07-17

## 2020-07-17 VITALS
SYSTOLIC BLOOD PRESSURE: 140 MMHG | WEIGHT: 182.1 LBS | TEMPERATURE: 98 F | HEIGHT: 66 IN | HEART RATE: 63 BPM | RESPIRATION RATE: 16 BRPM | DIASTOLIC BLOOD PRESSURE: 83 MMHG

## 2020-07-17 DIAGNOSIS — Z90.89 ACQUIRED ABSENCE OF OTHER ORGANS: Chronic | ICD-10-CM

## 2020-07-17 DIAGNOSIS — O34.219 MATERNAL CARE FOR UNSPECIFIED TYPE SCAR FROM PREVIOUS CESAREAN DELIVERY: ICD-10-CM

## 2020-07-17 DIAGNOSIS — Z98.890 OTHER SPECIFIED POSTPROCEDURAL STATES: Chronic | ICD-10-CM

## 2020-07-17 DIAGNOSIS — M25.9 JOINT DISORDER, UNSPECIFIED: Chronic | ICD-10-CM

## 2020-07-17 DIAGNOSIS — Z30.430 ENCOUNTER FOR INSERTION OF INTRAUTERINE CONTRACEPTIVE DEVICE: Chronic | ICD-10-CM

## 2020-07-17 LAB
ALBUMIN SERPL ELPH-MCNC: 2.6 G/DL — LOW (ref 3.3–5.2)
ALLERGY+IMMUNOLOGY DIAG STUDY NOTE: SIGNIFICANT CHANGE UP
ALP SERPL-CCNC: 212 U/L — HIGH (ref 40–120)
ALT FLD-CCNC: 17 U/L — SIGNIFICANT CHANGE UP
ANION GAP SERPL CALC-SCNC: 8 MMOL/L — SIGNIFICANT CHANGE UP (ref 5–17)
ANISOCYTOSIS BLD QL: SLIGHT — SIGNIFICANT CHANGE UP
APTT BLD: 28.8 SEC — SIGNIFICANT CHANGE UP (ref 27.5–35.5)
AST SERPL-CCNC: 39 U/L — HIGH
BASO STIPL BLD QL SMEAR: PRESENT — SIGNIFICANT CHANGE UP
BASOPHILS # BLD AUTO: 0 K/UL — SIGNIFICANT CHANGE UP (ref 0–0.2)
BASOPHILS # BLD AUTO: 0.02 K/UL — SIGNIFICANT CHANGE UP (ref 0–0.2)
BASOPHILS NFR BLD AUTO: 0 % — SIGNIFICANT CHANGE UP (ref 0–2)
BASOPHILS NFR BLD AUTO: 0.4 % — SIGNIFICANT CHANGE UP (ref 0–2)
BILIRUB SERPL-MCNC: 0.5 MG/DL — SIGNIFICANT CHANGE UP (ref 0.4–2)
BLD GP AB SCN SERPL QL: SIGNIFICANT CHANGE UP
BUN SERPL-MCNC: 11 MG/DL — SIGNIFICANT CHANGE UP (ref 8–20)
CALCIUM SERPL-MCNC: 7.9 MG/DL — LOW (ref 8.6–10.2)
CHLORIDE SERPL-SCNC: 105 MMOL/L — SIGNIFICANT CHANGE UP (ref 98–107)
CO2 SERPL-SCNC: 24 MMOL/L — SIGNIFICANT CHANGE UP (ref 22–29)
CREAT SERPL-MCNC: 0.79 MG/DL — SIGNIFICANT CHANGE UP (ref 0.5–1.3)
DIR ANTIGLOB POLYSPECIFIC INTERPRETATION: SIGNIFICANT CHANGE UP
EOSINOPHIL # BLD AUTO: 0 K/UL — SIGNIFICANT CHANGE UP (ref 0–0.5)
EOSINOPHIL # BLD AUTO: 0.06 K/UL — SIGNIFICANT CHANGE UP (ref 0–0.5)
EOSINOPHIL NFR BLD AUTO: 0 % — SIGNIFICANT CHANGE UP (ref 0–6)
EOSINOPHIL NFR BLD AUTO: 1.1 % — SIGNIFICANT CHANGE UP (ref 0–6)
FIBRINOGEN PPP-MCNC: 397 MG/DL — SIGNIFICANT CHANGE UP (ref 290–520)
GIANT PLATELETS BLD QL SMEAR: PRESENT — SIGNIFICANT CHANGE UP
GLUCOSE SERPL-MCNC: 109 MG/DL — HIGH (ref 70–99)
HCT VFR BLD CALC: 31.2 % — LOW (ref 34.5–45)
HCT VFR BLD CALC: 34.3 % — LOW (ref 34.5–45)
HGB BLD-MCNC: 10.2 G/DL — LOW (ref 11.5–15.5)
HGB BLD-MCNC: 11.7 G/DL — SIGNIFICANT CHANGE UP (ref 11.5–15.5)
IMM GRANULOCYTES NFR BLD AUTO: 1.5 % — SIGNIFICANT CHANGE UP (ref 0–1.5)
INR BLD: 0.95 RATIO — SIGNIFICANT CHANGE UP (ref 0.88–1.16)
LDH SERPL L TO P-CCNC: 436 U/L — HIGH (ref 98–192)
LYMPHOCYTES # BLD AUTO: 0.83 K/UL — LOW (ref 1–3.3)
LYMPHOCYTES # BLD AUTO: 1.55 K/UL — SIGNIFICANT CHANGE UP (ref 1–3.3)
LYMPHOCYTES # BLD AUTO: 13.2 % — SIGNIFICANT CHANGE UP (ref 13–44)
LYMPHOCYTES # BLD AUTO: 28.7 % — SIGNIFICANT CHANGE UP (ref 13–44)
MACROCYTES BLD QL: SLIGHT — SIGNIFICANT CHANGE UP
MANUAL SMEAR VERIFICATION: SIGNIFICANT CHANGE UP
MCHC RBC-ENTMCNC: 30.8 PG — SIGNIFICANT CHANGE UP (ref 27–34)
MCHC RBC-ENTMCNC: 31.5 PG — SIGNIFICANT CHANGE UP (ref 27–34)
MCHC RBC-ENTMCNC: 32.7 GM/DL — SIGNIFICANT CHANGE UP (ref 32–36)
MCHC RBC-ENTMCNC: 34.1 GM/DL — SIGNIFICANT CHANGE UP (ref 32–36)
MCV RBC AUTO: 92.2 FL — SIGNIFICANT CHANGE UP (ref 80–100)
MCV RBC AUTO: 94.3 FL — SIGNIFICANT CHANGE UP (ref 80–100)
MICROCYTES BLD QL: SLIGHT — SIGNIFICANT CHANGE UP
MONOCYTES # BLD AUTO: 0.22 K/UL — SIGNIFICANT CHANGE UP (ref 0–0.9)
MONOCYTES # BLD AUTO: 0.48 K/UL — SIGNIFICANT CHANGE UP (ref 0–0.9)
MONOCYTES NFR BLD AUTO: 3.5 % — SIGNIFICANT CHANGE UP (ref 2–14)
MONOCYTES NFR BLD AUTO: 8.9 % — SIGNIFICANT CHANGE UP (ref 2–14)
NEUTROPHILS # BLD AUTO: 3.22 K/UL — SIGNIFICANT CHANGE UP (ref 1.8–7.4)
NEUTROPHILS # BLD AUTO: 5.22 K/UL — SIGNIFICANT CHANGE UP (ref 1.8–7.4)
NEUTROPHILS NFR BLD AUTO: 59.4 % — SIGNIFICANT CHANGE UP (ref 43–77)
NEUTROPHILS NFR BLD AUTO: 82.4 % — HIGH (ref 43–77)
NEUTS BAND # BLD: 0.9 % — SIGNIFICANT CHANGE UP (ref 0–8)
PLAT MORPH BLD: NORMAL — SIGNIFICANT CHANGE UP
PLATELET # BLD AUTO: 114 K/UL — LOW (ref 150–400)
PLATELET # BLD AUTO: 95 K/UL — LOW (ref 150–400)
POTASSIUM SERPL-MCNC: 4.4 MMOL/L — SIGNIFICANT CHANGE UP (ref 3.5–5.3)
POTASSIUM SERPL-SCNC: 4.4 MMOL/L — SIGNIFICANT CHANGE UP (ref 3.5–5.3)
PROT SERPL-MCNC: 4.5 G/DL — LOW (ref 6.6–8.7)
PROTHROM AB SERPL-ACNC: 11.1 SEC — SIGNIFICANT CHANGE UP (ref 10.6–13.6)
RBC # BLD: 3.31 M/UL — LOW (ref 3.8–5.2)
RBC # BLD: 3.72 M/UL — LOW (ref 3.8–5.2)
RBC # FLD: 13.4 % — SIGNIFICANT CHANGE UP (ref 10.3–14.5)
RBC # FLD: 13.5 % — SIGNIFICANT CHANGE UP (ref 10.3–14.5)
RBC BLD AUTO: SIGNIFICANT CHANGE UP
SARS-COV-2 IGG SERPL QL IA: NEGATIVE — SIGNIFICANT CHANGE UP
SARS-COV-2 IGM SERPL IA-ACNC: 8.03 AU/ML — SIGNIFICANT CHANGE UP
SARS-COV-2 RNA SPEC QL NAA+PROBE: SIGNIFICANT CHANGE UP
SODIUM SERPL-SCNC: 137 MMOL/L — SIGNIFICANT CHANGE UP (ref 135–145)
T PALLIDUM AB TITR SER: NEGATIVE — SIGNIFICANT CHANGE UP
URATE SERPL-MCNC: 6.6 MG/DL — HIGH (ref 2.4–5.7)
WBC # BLD: 5.41 K/UL — SIGNIFICANT CHANGE UP (ref 3.8–10.5)
WBC # BLD: 6.27 K/UL — SIGNIFICANT CHANGE UP (ref 3.8–10.5)
WBC # FLD AUTO: 5.41 K/UL — SIGNIFICANT CHANGE UP (ref 3.8–10.5)
WBC # FLD AUTO: 6.27 K/UL — SIGNIFICANT CHANGE UP (ref 3.8–10.5)

## 2020-07-17 PROCEDURE — 59510 CESAREAN DELIVERY: CPT

## 2020-07-17 PROCEDURE — 86077 PHYS BLOOD BANK SERV XMATCH: CPT

## 2020-07-17 RX ORDER — SIMETHICONE 80 MG/1
80 TABLET, CHEWABLE ORAL EVERY 4 HOURS
Refills: 0 | Status: DISCONTINUED | OUTPATIENT
Start: 2020-07-17 | End: 2020-07-19

## 2020-07-17 RX ORDER — DIPHENHYDRAMINE HCL 50 MG
25 CAPSULE ORAL EVERY 6 HOURS
Refills: 0 | Status: DISCONTINUED | OUTPATIENT
Start: 2020-07-17 | End: 2020-07-19

## 2020-07-17 RX ORDER — MAGNESIUM HYDROXIDE 400 MG/1
30 TABLET, CHEWABLE ORAL
Refills: 0 | Status: DISCONTINUED | OUTPATIENT
Start: 2020-07-17 | End: 2020-07-19

## 2020-07-17 RX ORDER — CITRIC ACID/SODIUM CITRATE 300-500 MG
30 SOLUTION, ORAL ORAL ONCE
Refills: 0 | Status: COMPLETED | OUTPATIENT
Start: 2020-07-17 | End: 2020-07-17

## 2020-07-17 RX ORDER — ONDANSETRON 8 MG/1
4 TABLET, FILM COATED ORAL EVERY 6 HOURS
Refills: 0 | Status: DISCONTINUED | OUTPATIENT
Start: 2020-07-17 | End: 2020-07-19

## 2020-07-17 RX ORDER — TETANUS TOXOID, REDUCED DIPHTHERIA TOXOID AND ACELLULAR PERTUSSIS VACCINE, ADSORBED 5; 2.5; 8; 8; 2.5 [IU]/.5ML; [IU]/.5ML; UG/.5ML; UG/.5ML; UG/.5ML
0.5 SUSPENSION INTRAMUSCULAR ONCE
Refills: 0 | Status: DISCONTINUED | OUTPATIENT
Start: 2020-07-17 | End: 2020-07-19

## 2020-07-17 RX ORDER — METOCLOPRAMIDE HCL 10 MG
10 TABLET ORAL ONCE
Refills: 0 | Status: COMPLETED | OUTPATIENT
Start: 2020-07-17 | End: 2020-07-17

## 2020-07-17 RX ORDER — OXYTOCIN 10 UNIT/ML
333.33 VIAL (ML) INJECTION
Qty: 20 | Refills: 0 | Status: DISCONTINUED | OUTPATIENT
Start: 2020-07-17 | End: 2020-07-19

## 2020-07-17 RX ORDER — FAMOTIDINE 10 MG/ML
20 INJECTION INTRAVENOUS ONCE
Refills: 0 | Status: COMPLETED | OUTPATIENT
Start: 2020-07-17 | End: 2020-07-17

## 2020-07-17 RX ORDER — KETOROLAC TROMETHAMINE 30 MG/ML
30 SYRINGE (ML) INJECTION EVERY 6 HOURS
Refills: 0 | Status: DISCONTINUED | OUTPATIENT
Start: 2020-07-17 | End: 2020-07-18

## 2020-07-17 RX ORDER — SODIUM CHLORIDE 9 MG/ML
1000 INJECTION, SOLUTION INTRAVENOUS
Refills: 0 | Status: DISCONTINUED | OUTPATIENT
Start: 2020-07-17 | End: 2020-07-17

## 2020-07-17 RX ORDER — OXYCODONE HYDROCHLORIDE 5 MG/1
5 TABLET ORAL ONCE
Refills: 0 | Status: DISCONTINUED | OUTPATIENT
Start: 2020-07-17 | End: 2020-07-19

## 2020-07-17 RX ORDER — IBUPROFEN 200 MG
600 TABLET ORAL EVERY 6 HOURS
Refills: 0 | Status: COMPLETED | OUTPATIENT
Start: 2020-07-17 | End: 2021-06-15

## 2020-07-17 RX ORDER — GENTAMICIN SULFATE 40 MG/ML
410 VIAL (ML) INJECTION ONCE
Refills: 0 | Status: DISCONTINUED | OUTPATIENT
Start: 2020-07-17 | End: 2020-07-17

## 2020-07-17 RX ORDER — ACETAMINOPHEN 500 MG
975 TABLET ORAL
Refills: 0 | Status: DISCONTINUED | OUTPATIENT
Start: 2020-07-17 | End: 2020-07-19

## 2020-07-17 RX ORDER — ENOXAPARIN SODIUM 100 MG/ML
40 INJECTION SUBCUTANEOUS EVERY 24 HOURS
Refills: 0 | Status: DISCONTINUED | OUTPATIENT
Start: 2020-07-17 | End: 2020-07-19

## 2020-07-17 RX ORDER — LANOLIN
1 OINTMENT (GRAM) TOPICAL EVERY 6 HOURS
Refills: 0 | Status: DISCONTINUED | OUTPATIENT
Start: 2020-07-17 | End: 2020-07-19

## 2020-07-17 RX ORDER — SODIUM CHLORIDE 9 MG/ML
1000 INJECTION, SOLUTION INTRAVENOUS ONCE
Refills: 0 | Status: COMPLETED | OUTPATIENT
Start: 2020-07-17 | End: 2020-07-17

## 2020-07-17 RX ORDER — OXYCODONE HYDROCHLORIDE 5 MG/1
5 TABLET ORAL
Refills: 0 | Status: DISCONTINUED | OUTPATIENT
Start: 2020-07-17 | End: 2020-07-19

## 2020-07-17 RX ORDER — SODIUM CHLORIDE 9 MG/ML
1000 INJECTION, SOLUTION INTRAVENOUS
Refills: 0 | Status: DISCONTINUED | OUTPATIENT
Start: 2020-07-17 | End: 2020-07-19

## 2020-07-17 RX ORDER — GENTAMICIN SULFATE 40 MG/ML
200 VIAL (ML) INJECTION ONCE
Refills: 0 | Status: COMPLETED | OUTPATIENT
Start: 2020-07-17 | End: 2020-07-17

## 2020-07-17 RX ADMIN — ENOXAPARIN SODIUM 40 MILLIGRAM(S): 100 INJECTION SUBCUTANEOUS at 20:35

## 2020-07-17 RX ADMIN — Medication 30 MILLILITER(S): at 09:57

## 2020-07-17 RX ADMIN — Medication 10 MILLIGRAM(S): at 09:57

## 2020-07-17 RX ADMIN — Medication 100 MILLIGRAM(S): at 10:10

## 2020-07-17 RX ADMIN — Medication 30 MILLIGRAM(S): at 17:35

## 2020-07-17 RX ADMIN — ONDANSETRON 4 MILLIGRAM(S): 8 TABLET, FILM COATED ORAL at 20:35

## 2020-07-17 RX ADMIN — FAMOTIDINE 20 MILLIGRAM(S): 10 INJECTION INTRAVENOUS at 09:57

## 2020-07-17 RX ADMIN — Medication 975 MILLIGRAM(S): at 21:30

## 2020-07-17 RX ADMIN — Medication 150 MILLIGRAM(S): at 10:38

## 2020-07-17 RX ADMIN — Medication 30 MILLIGRAM(S): at 12:59

## 2020-07-17 RX ADMIN — SODIUM CHLORIDE 2000 MILLILITER(S): 9 INJECTION, SOLUTION INTRAVENOUS at 08:10

## 2020-07-17 RX ADMIN — Medication 1000 MILLIUNIT(S)/MIN: at 10:38

## 2020-07-17 RX ADMIN — Medication 975 MILLIGRAM(S): at 20:35

## 2020-07-17 RX ADMIN — Medication 30 MILLIGRAM(S): at 17:17

## 2020-07-17 RX ADMIN — SODIUM CHLORIDE 125 MILLILITER(S): 9 INJECTION, SOLUTION INTRAVENOUS at 08:45

## 2020-07-17 RX ADMIN — Medication 100 MILLIGRAM(S): at 17:16

## 2020-07-17 RX ADMIN — Medication 25 MILLIGRAM(S): at 14:00

## 2020-07-17 NOTE — OB NEONATOLOGY/PEDIATRICIAN DELIVERY SUMMARY - NSPEDSNEONOTESB_OBGYN_ALL_OB_FT
I was called to attend this repeat  of this mother pregnant with 37 week twin gestation.  Twin B came out crying with good respiratory effort. Britt-nasopharyngeal suctioning done.  Apagars 9-9. Baby admitted to regular nursery.

## 2020-07-17 NOTE — OB RN DELIVERY SUMMARY - NS_PLACENTDISPOB_OBGYN_ALL_OB
Called pt to [facility] lab appt, [place] [facility] [place]    Sent to Pathology

## 2020-07-17 NOTE — OB POSTPARTUM EVENT NOTE - NS_EVENTPTSUMMARY1_OBGYN_ALL_OB_FT
ICU Vital Signs Last 24 Hrs  T(C): 36.5 (17 Jul 2020 16:22), Max: 36.6 (17 Jul 2020 06:47)  T(F): 97.7 (17 Jul 2020 16:22), Max: 97.9 (17 Jul 2020 06:47)  HR: 96 (17 Jul 2020 16:22) (52 - 96)  BP: 150/84 (17 Jul 2020 16:40) (107/69 - 165/88)  RR: 18 (17 Jul 2020 16:22) (16 - 19)  SpO2: 100% (17 Jul 2020 13:05) (99% - 100%) ICU Vital Signs Last 24 Hrs  T(C): 36.5 (17 Jul 2020 16:22), Max: 36.6 (17 Jul 2020 06:47)  T(F): 97.7 (17 Jul 2020 16:22), Max: 97.9 (17 Jul 2020 06:47)  HR: 96 (17 Jul 2020 16:22) (52 - 96)  BP: 150/84 (17 Jul 2020 16:40) (107/69 - 165/88)  RR: 18 (17 Jul 2020 16:22) (16 - 19)  SpO2: 100% (17 Jul 2020 13:05) (99% - 100%)    Gen: NAD  Heat: normal S1/S2, RRR  Lungs: CTAB

## 2020-07-17 NOTE — OB POSTPARTUM EVENT NOTE - NS_EVENTSUMMARY1_OBGYN_ALL_OB_FT
Pt is a 39 year old  POD#0 s/p repeat CS at 37.1 weeks in the setting of di/di twins and cholestasis of pregnancy. Pt was noted to have an elevated BP of 150/84 at 1630. Previous elevated BP was 157/90 at 1346. Pt was evaluated at bedside at this time. She denies headaches, visual disturbances, epigastric pain, RUQ pain and edema.

## 2020-07-17 NOTE — DISCHARGE NOTE OB - PATIENT PORTAL LINK FT
You can access the FollowMyHealth Patient Portal offered by [facility] by registering at the following website: [email or web address]. By joining [facility]’s FollowMyHealth portal, you will also be able to view your health information using other applications (apps) compatible with our system.

## 2020-07-17 NOTE — DISCHARGE NOTE OB - CARE PROVIDER_API CALL
Esther Winter (DO)  Obstetrics and Gynecology  [address] [place] [address]  Phone: [phone]  Fax: [phone]  Follow Up Time:

## 2020-07-17 NOTE — DISCHARGE NOTE OB - MEDICATION SUMMARY - MEDICATIONS TO TAKE
I will START or STAY ON the medications listed below when I get home from the hospital:     mg oral tablet  -- 1 tab(s) by mouth every 6 hours   -- Do not take this drug if you are pregnant.  It is very important that you take or use this exactly as directed.  Do not skip doses or discontinue unless directed by your doctor.  May cause drowsiness or dizziness.  Obtain medical advice before taking any non-prescription drugs as some may affect the action of this medication.  Take with food or milk.    -- Indication: For Moderate pain    docusate sodium 100 mg oral capsule  -- 1 cap(s) by mouth 2 times a day, As needed, Stool Softening  -- Indication: For Constipation

## 2020-07-17 NOTE — OB PROVIDER DELIVERY SUMMARY - NSPROVIDERDELIVERYNOTE_OBGYN_ALL_OB_FT
Male infant delivered - 7 lb 5 oz, apgar 9/9  female infant B delivered 5 lb 6 oz, apgar 9/9    EBL: 600.     Uterus closed in 1 layer

## 2020-07-17 NOTE — OB RN DELIVERY SUMMARY - NS_SEPSISRSKCALC_OBGYN_ALL_OB_FT
EOS calculated successfully. EOS Risk Factor: 0.5/1000 live births ([facility] national incidence); GA=37w1d; Temp=97.9; ROM=0; GBS='Negative'; Antibiotics='No antibiotics or any antibiotics < 2 hrs prior to birth'

## 2020-07-17 NOTE — DISCHARGE NOTE OB - HOSPITAL COURSE
Pt is a 40 yo  s/p  section. She was transferred to postpartum unit without complications during her stay. Upon discharge she is voiding, tolerating PO, ambulating, and pain is well controlled.

## 2020-07-17 NOTE — OB NEONATOLOGY/PEDIATRICIAN DELIVERY SUMMARY - NSPEDSNEONOTESA_OBGYN_ALL_OB_FT
I was called to attend this repeat  whose mother is pregnant with 37 week gestation twins.  Baby came out crying with good respiratory effort. Britt-nasopharyngeal suctioning done. Apgars 9-9. Baby admitted to regular nursery. I was called to attend this repeat  whose mother is pregnant with 37 week gestation twins.  Baby Twin A came out crying with good respiratory effort. Britt-nasopharyngeal suctioning done. Apgars 9-9. Baby admitted to regular nursery.

## 2020-07-17 NOTE — OB POSTPARTUM EVENT NOTE - NS_EVENTFINDINGS1_OBGYN_ALL_OB_FT
38yo  POD#0 s/p repeat CS at 37.1 weeks for di/di twins and cholestasis of pregnancy. Pt was noted to have 2 elevated BP (157/90, 150/84). Urine output is adequate (50cc/hr). She denies any PEC at this time. Pt is otherwise stable. PEC labs ordered. Will continue to monitor BP and follow up labs.    Discussed with Dr. Meeks.

## 2020-07-18 LAB
BASOPHILS # BLD AUTO: 0.03 K/UL — SIGNIFICANT CHANGE UP (ref 0–0.2)
BASOPHILS NFR BLD AUTO: 0.4 % — SIGNIFICANT CHANGE UP (ref 0–2)
CREAT ?TM UR-MCNC: 37 MG/DL — SIGNIFICANT CHANGE UP
EOSINOPHIL # BLD AUTO: 0.05 K/UL — SIGNIFICANT CHANGE UP (ref 0–0.5)
EOSINOPHIL NFR BLD AUTO: 0.7 % — SIGNIFICANT CHANGE UP (ref 0–6)
FETAL SCREEN: SIGNIFICANT CHANGE UP
HCT VFR BLD CALC: 26.9 % — LOW (ref 34.5–45)
HGB BLD-MCNC: 9 G/DL — LOW (ref 11.5–15.5)
IMM GRANULOCYTES NFR BLD AUTO: 0.6 % — SIGNIFICANT CHANGE UP (ref 0–1.5)
LYMPHOCYTES # BLD AUTO: 1.12 K/UL — SIGNIFICANT CHANGE UP (ref 1–3.3)
LYMPHOCYTES # BLD AUTO: 16.4 % — SIGNIFICANT CHANGE UP (ref 13–44)
MCHC RBC-ENTMCNC: 31.5 PG — SIGNIFICANT CHANGE UP (ref 27–34)
MCHC RBC-ENTMCNC: 33.5 GM/DL — SIGNIFICANT CHANGE UP (ref 32–36)
MCV RBC AUTO: 94.1 FL — SIGNIFICANT CHANGE UP (ref 80–100)
MONOCYTES # BLD AUTO: 0.62 K/UL — SIGNIFICANT CHANGE UP (ref 0–0.9)
MONOCYTES NFR BLD AUTO: 9.1 % — SIGNIFICANT CHANGE UP (ref 2–14)
NEUTROPHILS # BLD AUTO: 4.96 K/UL — SIGNIFICANT CHANGE UP (ref 1.8–7.4)
NEUTROPHILS NFR BLD AUTO: 72.8 % — SIGNIFICANT CHANGE UP (ref 43–77)
PLATELET # BLD AUTO: 93 K/UL — LOW (ref 150–400)
PROT ?TM UR-MCNC: 7 MG/DL — SIGNIFICANT CHANGE UP (ref 0–12)
PROT/CREAT UR-RTO: 0.2 RATIO — SIGNIFICANT CHANGE UP
RBC # BLD: 2.86 M/UL — LOW (ref 3.8–5.2)
RBC # FLD: 13.4 % — SIGNIFICANT CHANGE UP (ref 10.3–14.5)
WBC # BLD: 6.82 K/UL — SIGNIFICANT CHANGE UP (ref 3.8–10.5)
WBC # FLD AUTO: 6.82 K/UL — SIGNIFICANT CHANGE UP (ref 3.8–10.5)

## 2020-07-18 PROCEDURE — 99251: CPT

## 2020-07-18 RX ORDER — IBUPROFEN 200 MG
600 TABLET ORAL EVERY 6 HOURS
Refills: 0 | Status: DISCONTINUED | OUTPATIENT
Start: 2020-07-18 | End: 2020-07-19

## 2020-07-18 RX ADMIN — Medication 975 MILLIGRAM(S): at 03:30

## 2020-07-18 RX ADMIN — Medication 100 MILLIGRAM(S): at 01:57

## 2020-07-18 RX ADMIN — Medication 975 MILLIGRAM(S): at 10:15

## 2020-07-18 RX ADMIN — Medication 975 MILLIGRAM(S): at 09:22

## 2020-07-18 RX ADMIN — Medication 600 MILLIGRAM(S): at 12:30

## 2020-07-18 RX ADMIN — Medication 30 MILLIGRAM(S): at 01:50

## 2020-07-18 RX ADMIN — Medication 975 MILLIGRAM(S): at 02:34

## 2020-07-18 RX ADMIN — Medication 975 MILLIGRAM(S): at 22:15

## 2020-07-18 RX ADMIN — Medication 975 MILLIGRAM(S): at 15:06

## 2020-07-18 RX ADMIN — Medication 600 MILLIGRAM(S): at 17:41

## 2020-07-18 RX ADMIN — Medication 30 MILLIGRAM(S): at 02:04

## 2020-07-18 RX ADMIN — SIMETHICONE 80 MILLIGRAM(S): 80 TABLET, CHEWABLE ORAL at 15:06

## 2020-07-18 RX ADMIN — Medication 975 MILLIGRAM(S): at 16:00

## 2020-07-18 RX ADMIN — Medication 975 MILLIGRAM(S): at 21:18

## 2020-07-18 NOTE — CHART NOTE - NSCHARTNOTEFT_GEN_A_CORE
Patient is a 39y  s/p repeat scheduled  Delivery post-operative day 1. Pt was seen at bedside. She is doing well. +void. She denies headaches, visual changes, epigastric pain, RUQ pain. No complaints at this time.     Vital Signs Last 24 Hrs  T(C): 36.7 (2020 16:06), Max: 37.2 (2020 00:09)  T(F): 98.1 (2020 16:06), Max: 98.9 (2020 00:09)  HR: 64 (2020 16:06) (51 - 96)  BP: 124/79 (2020 16:06) (119/70 - 148/83)  RR: 18 (2020 16:06) (18 - 18)    Continue with routine care.

## 2020-07-18 NOTE — PROGRESS NOTE ADULT - SUBJECTIVE AND OBJECTIVE BOX
LUIS [id number]  Postpartum Note,  Section  Patient is a 39y  s/p repeat scheduled  Delivery post-operative day 1.    Subjective:  No acute events overnight. The patient is feeling well.   She is tolerating a diet and denies N/V.  Patient is having normal postpartum bleeding which is decreasing in amount.    She is breastfeeding and the babies are latching on.    Pt failed her trial of void and was strait cathed, has until 9am to void  -BM/+flatus.    Physical exam:     Vital Signs Last 24 Hrs  T(C): 37.2 (2020 04:00), Max: 37.2 (2020 00:09)  T(F): 98.9 (2020 04:00), Max: 98.9 (2020 00:09)  HR: 51 (2020 04:00) (51 - 96)  BP: 119/70 (2020 04:00) (107/69 - 165/88)  RR: 18 (2020 04:00) (16 - 19)  SpO2: 100% (2020 13:05) (99% - 100%)    Heart: RRR  Lungs: CTABL  Breast: non tender, not engorged   Abdomen: Soft, nontender, no distension, firm uterine fundus below umbilicus  Continue with current postoperativa management  Continue current pain medication regimen.   Encourage ambulation and advancing diet as tolerated.   Incision:  clean dry and intact  Ext: No DVT signs, warm extremities    LABS:                        10.2   6.27  )-----------( 95       ( 2020 18:28 )             31.2         acetaminophen   Tablet .. 975 milliGRAM(s) Oral <User Schedule>  diphenhydrAMINE 25 milliGRAM(s) Oral every 6 hours PRN  diphtheria/tetanus/pertussis (acellular) Vaccine (ADAcel) 0.5 milliLiter(s) IntraMuscular once  enoxaparin Injectable 40 milliGRAM(s) SubCutaneous every 24 hours  ibuprofen  Tablet. 600 milliGRAM(s) Oral every 6 hours  lactated ringers. 1000 milliLiter(s) IV Continuous <Continuous>  lanolin Ointment 1 Application(s) Topical every 6 hours PRN  magnesium hydroxide Suspension 30 milliLiter(s) Oral two times a day PRN  ondansetron Injectable 4 milliGRAM(s) IV Push every 6 hours  oxyCODONE    IR 5 milliGRAM(s) Oral every 3 hours PRN  oxyCODONE    IR 5 milliGRAM(s) Oral once PRN  oxytocin Infusion 333.333 milliUNIT(s)/Min IV Continuous <Continuous>  oxytocin Infusion 333.333 milliUNIT(s)/Min IV Continuous <Continuous>  simethicone 80 milliGRAM(s) Chew every 4 hours PRN

## 2020-07-18 NOTE — PROGRESS NOTE ADULT - ASSESSMENT
A/P   Section Day: 1  Patient is feeling well overall.   Continue DVT ppx  follow up TOV  Plan to discharge according to routine criteria.

## 2020-07-18 NOTE — CHART NOTE - NSCHARTNOTEFT_GEN_A_CORE
Patient is a 39 year old  at 37.1 weeks by LMP who presents on 20 to [facility] L&D for a scheduled repeat CS in the setting of di/di twins and cholestasis of pregnancy. Patient is blood group A RhD negative, and she received pre-gianfranco RhIg on 20. Blood sample on 20 has a positive antibody screen, with anti-D identified in patient's plasma. Anti-D is most likely due to prior administration of RhIg. Passive Anti-D due to Rh Immune Globulin administration is not implicated in Hemolytic Transfusion Reactions or Hemolytic Disease of the Fetus and Kidder. Passive Anti-D can cross the placenta and cause a positive EZEQUIEL in a . If transfusion is clinically indicated, and RhD negative blood is available in blood bank inventory, the patient should receive RhD negative blood, crossmatch compatible through the AHG phase of testing until the RhIG is no longer detectable in the patient’s plasma.

## 2020-07-19 VITALS
DIASTOLIC BLOOD PRESSURE: 69 MMHG | TEMPERATURE: 98 F | HEART RATE: 57 BPM | SYSTOLIC BLOOD PRESSURE: 131 MMHG | RESPIRATION RATE: 18 BRPM

## 2020-07-19 LAB
ALBUMIN SERPL ELPH-MCNC: 2.3 G/DL — LOW (ref 3.3–5.2)
ALP SERPL-CCNC: 178 U/L — HIGH (ref 40–120)
ALT FLD-CCNC: 16 U/L — SIGNIFICANT CHANGE UP
ANION GAP SERPL CALC-SCNC: 12 MMOL/L — SIGNIFICANT CHANGE UP (ref 5–17)
AST SERPL-CCNC: 31 U/L — SIGNIFICANT CHANGE UP
BILIRUB SERPL-MCNC: <0.2 MG/DL — LOW (ref 0.4–2)
BUN SERPL-MCNC: 10 MG/DL — SIGNIFICANT CHANGE UP (ref 8–20)
CALCIUM SERPL-MCNC: 8.3 MG/DL — LOW (ref 8.6–10.2)
CHLORIDE SERPL-SCNC: 104 MMOL/L — SIGNIFICANT CHANGE UP (ref 98–107)
CO2 SERPL-SCNC: 23 MMOL/L — SIGNIFICANT CHANGE UP (ref 22–29)
CREAT SERPL-MCNC: 0.89 MG/DL — SIGNIFICANT CHANGE UP (ref 0.5–1.3)
GLUCOSE SERPL-MCNC: 122 MG/DL — HIGH (ref 70–99)
HCT VFR BLD CALC: 27.4 % — LOW (ref 34.5–45)
HGB BLD-MCNC: 8.9 G/DL — LOW (ref 11.5–15.5)
MCHC RBC-ENTMCNC: 31.1 PG — SIGNIFICANT CHANGE UP (ref 27–34)
MCHC RBC-ENTMCNC: 32.5 GM/DL — SIGNIFICANT CHANGE UP (ref 32–36)
MCV RBC AUTO: 95.8 FL — SIGNIFICANT CHANGE UP (ref 80–100)
PLATELET # BLD AUTO: 130 K/UL — LOW (ref 150–400)
POTASSIUM SERPL-MCNC: 4.1 MMOL/L — SIGNIFICANT CHANGE UP (ref 3.5–5.3)
POTASSIUM SERPL-SCNC: 4.1 MMOL/L — SIGNIFICANT CHANGE UP (ref 3.5–5.3)
PROT SERPL-MCNC: 4.5 G/DL — LOW (ref 6.6–8.7)
RBC # BLD: 2.86 M/UL — LOW (ref 3.8–5.2)
RBC # FLD: 13.9 % — SIGNIFICANT CHANGE UP (ref 10.3–14.5)
SODIUM SERPL-SCNC: 138 MMOL/L — SIGNIFICANT CHANGE UP (ref 135–145)
WBC # BLD: 8.31 K/UL — SIGNIFICANT CHANGE UP (ref 3.8–10.5)
WBC # FLD AUTO: 8.31 K/UL — SIGNIFICANT CHANGE UP (ref 3.8–10.5)

## 2020-07-19 PROCEDURE — 80053 COMPREHEN METABOLIC PANEL: CPT

## 2020-07-19 PROCEDURE — 83615 LACTATE (LD) (LDH) ENZYME: CPT

## 2020-07-19 PROCEDURE — 59050 FETAL MONITOR W/REPORT: CPT

## 2020-07-19 PROCEDURE — 85461 HEMOGLOBIN FETAL: CPT

## 2020-07-19 PROCEDURE — 85027 COMPLETE CBC AUTOMATED: CPT

## 2020-07-19 PROCEDURE — 88307 TISSUE EXAM BY PATHOLOGIST: CPT

## 2020-07-19 PROCEDURE — 86901 BLOOD TYPING SEROLOGIC RH(D): CPT

## 2020-07-19 PROCEDURE — 82785 ASSAY OF IGE: CPT

## 2020-07-19 PROCEDURE — 88305 TISSUE EXAM BY PATHOLOGIST: CPT

## 2020-07-19 PROCEDURE — 86900 BLOOD TYPING SEROLOGIC ABO: CPT

## 2020-07-19 PROCEDURE — 82570 ASSAY OF URINE CREATININE: CPT

## 2020-07-19 PROCEDURE — G0463: CPT

## 2020-07-19 PROCEDURE — 84156 ASSAY OF PROTEIN URINE: CPT

## 2020-07-19 PROCEDURE — 86850 RBC ANTIBODY SCREEN: CPT

## 2020-07-19 PROCEDURE — 85384 FIBRINOGEN ACTIVITY: CPT

## 2020-07-19 PROCEDURE — 84550 ASSAY OF BLOOD/URIC ACID: CPT

## 2020-07-19 PROCEDURE — 86780 TREPONEMA PALLIDUM: CPT

## 2020-07-19 PROCEDURE — 87635 SARS-COV-2 COVID-19 AMP PRB: CPT

## 2020-07-19 PROCEDURE — 59025 FETAL NON-STRESS TEST: CPT

## 2020-07-19 PROCEDURE — 85610 PROTHROMBIN TIME: CPT

## 2020-07-19 PROCEDURE — 36415 COLL VENOUS BLD VENIPUNCTURE: CPT

## 2020-07-19 PROCEDURE — 85730 THROMBOPLASTIN TIME PARTIAL: CPT

## 2020-07-19 PROCEDURE — 86880 COOMBS TEST DIRECT: CPT

## 2020-07-19 PROCEDURE — 86003 ALLG SPEC IGE CRUDE XTRC EA: CPT

## 2020-07-19 PROCEDURE — 86769 SARS-COV-2 COVID-19 ANTIBODY: CPT

## 2020-07-19 PROCEDURE — 86870 RBC ANTIBODY IDENTIFICATION: CPT

## 2020-07-19 RX ORDER — IBUPROFEN 200 MG
1 TABLET ORAL
Qty: 28 | Refills: 0
Start: 2020-07-19

## 2020-07-19 RX ADMIN — Medication 975 MILLIGRAM(S): at 09:48

## 2020-07-19 RX ADMIN — Medication 975 MILLIGRAM(S): at 10:38

## 2020-07-19 RX ADMIN — Medication 600 MILLIGRAM(S): at 05:57

## 2020-07-19 NOTE — PROGRESS NOTE ADULT - SUBJECTIVE AND OBJECTIVE BOX
Postpartum Note,  Section  Patient is a 38yo  s/p repeat  for cholestasis and di/di twins post-operative day 2.    Subjective:  No acute events overnight. The patient is feeling well.   She is tolerating a diet and denies N/V.    Patient is having normal postpartum bleeding which is decreasing in amount.    She is breastfeeding and the babies is latching on.    Urinating appropriately.   -BM/+flatus.    Physical exam:    Vital Signs Last 24 Hrs  T(C): 36.9 (2020 04:30), Max: 36.9 (2020 04:30)  T(F): 98.5 (2020 04:30), Max: 98.5 (2020 04:30)  HR: 57 (2020 04:30) (57 - 64)  BP: 131/69 (2020 04:30) (124/79 - 131/69)  RR: 18 (2020 04:30) (18 - 18)    General: NAD  Heart: RRR  Lungs: CTABL  Breast: non tender, not engorged   Abdomen: Soft, nontender, no distension, firm uterine fundus, the incision is clean dry and intact  Ext: No DVT signs, warm extremities    LABS:                        9.0    6.82  )-----------( 93       ( 2020 07:27 )             26.9

## 2020-07-19 NOTE — PROGRESS NOTE ADULT - ASSESSMENT
Patient is a 40yo  s/p repeat  for cholestasis and di/di twins post-operative day 2.  Patient is feeling well overall.     Continue the current pain medication.   Encourage ambulation and regular diet.   Encourage incentive spirometry.  Promote mother baby bonding.   : voiding spontaneously.  GI: +flatus, stool softeners PRN.  Continue DVT ppx: SCDs, ambulating, lovenox.   Had thrombocytopenia, plt 95->93, will repeat CBC this AM.  Tentative d/c plans for today, pending AM rounds and CBC.

## 2020-07-20 ENCOUNTER — APPOINTMENT (OUTPATIENT)
Dept: MATERNAL FETAL MEDICINE | Facility: CLINIC | Age: 40
End: 2020-07-20

## 2020-07-20 ENCOUNTER — APPOINTMENT (OUTPATIENT)
Dept: ANTEPARTUM | Facility: CLINIC | Age: 40
End: 2020-07-20

## 2020-07-20 ENCOUNTER — APPOINTMENT (OUTPATIENT)
Dept: OBGYN | Facility: CLINIC | Age: 40
End: 2020-07-20

## 2020-07-20 LAB
SARS-COV-2 IGG SERPL IA-ACNC: 6.08 RATIO — HIGH
SARS-COV-2 IGG SERPL QL IA: ABNORMAL
SARS-COV-2 IGG SERPL QL IA: POSITIVE
SARS-COV-2 IGM SERPL IA-ACNC: 1.03 RATIO — HIGH

## 2020-07-21 LAB
CARROT IGE QN: <0.1 KUA/L — SIGNIFICANT CHANGE UP
CORN IGE QN: <0.1 KUA/L — SIGNIFICANT CHANGE UP
DEPRECATED CARROT IGE RAST QL: 0 — SIGNIFICANT CHANGE UP
DEPRECATED CORN IGE RAST QL: 0 — SIGNIFICANT CHANGE UP
DEPRECATED PEA IGE RAST QL: 0 — SIGNIFICANT CHANGE UP
DEPRECATED POTATO IGE RAST QL: 0 — SIGNIFICANT CHANGE UP
DEPRECATED WHITE BEAN IGE RAST QL: 0 — SIGNIFICANT CHANGE UP
PEA IGE QN: <0.1 KUA/L — SIGNIFICANT CHANGE UP
POTATO IGE QN: <0.1 KUA/L — SIGNIFICANT CHANGE UP
SURGICAL PATHOLOGY STUDY: SIGNIFICANT CHANGE UP
TOTAL IGE SMQN RAST: 119 KU/L — HIGH
TOTAL IGE SMQN RAST: SIGNIFICANT CHANGE UP
WHITE BEAN IGE QN: <0.1 KUA/L — SIGNIFICANT CHANGE UP

## 2020-07-31 ENCOUNTER — APPOINTMENT (OUTPATIENT)
Dept: OBGYN | Facility: CLINIC | Age: 40
End: 2020-07-31
Payer: COMMERCIAL

## 2020-07-31 VITALS
BODY MASS INDEX: 23.78 KG/M2 | HEIGHT: 66 IN | HEART RATE: 61 BPM | SYSTOLIC BLOOD PRESSURE: 119 MMHG | WEIGHT: 148 LBS | DIASTOLIC BLOOD PRESSURE: 85 MMHG

## 2020-07-31 PROCEDURE — 0503F POSTPARTUM CARE VISIT: CPT

## 2020-07-31 NOTE — HISTORY OF PRESENT ILLNESS
[Clean/Dry/Intact] : clean, dry and intact [FreeTextEntry8] : 39 year old female presents for incision check.  She is s/p repeat CS for twins at 37 weeks.  Pregnancy was complicated by cholestasis and AMA.  She has no complaints.  She is feeling well.  She is not taking pain meds.  Minimal lochia.  She is breastfeeding and supplementing with bottles.  No fevers.  Itching has resolved.   [de-identified] : 39 year old female s/p repeat CS, doing well [de-identified] : Steri strips removed.  Wound care d/w patient.  Call parameters reviewed.  RTO in 2 weeks for pp visit.

## 2020-09-03 ENCOUNTER — APPOINTMENT (OUTPATIENT)
Dept: OBGYN | Facility: CLINIC | Age: 40
End: 2020-09-03
Payer: COMMERCIAL

## 2020-09-03 VITALS
DIASTOLIC BLOOD PRESSURE: 70 MMHG | WEIGHT: 144 LBS | BODY MASS INDEX: 23.14 KG/M2 | HEIGHT: 66 IN | SYSTOLIC BLOOD PRESSURE: 112 MMHG

## 2020-09-03 PROCEDURE — 0503F POSTPARTUM CARE VISIT: CPT

## 2020-09-03 NOTE — HISTORY OF PRESENT ILLNESS
[Delivery Date: ___] : on [unfilled] [Repeat C/S] : delivered by  section (repeat) [Multiples: ___] : Delivery History: [unfilled] babies [BTL] : no tubal ligation [Breastfeeding] : currently nursing [S/Sx PP Depression] : no signs/symptoms of postpartum depression [Clean/Dry/Intact] : clean, dry and intact [Back to Normal] : is back to normal in size [None] : no vaginal bleeding [Normal] : the vagina was normal [Cervix Sample Taken] : cervical sample taken for a Pap smear [Doing Well] : is doing well [de-identified] : has history of ascus cannot exclude hgsil  pap with negative colpo; pap repeated today; wants to consider paragard iud

## 2020-09-08 LAB — HPV HIGH+LOW RISK DNA PNL CVX: NOT DETECTED

## 2020-09-14 LAB — CYTOLOGY CVX/VAG DOC THIN PREP: ABNORMAL

## 2021-02-22 ENCOUNTER — TRANSCRIPTION ENCOUNTER (OUTPATIENT)
Age: 41
End: 2021-02-22

## 2021-05-10 ENCOUNTER — APPOINTMENT (OUTPATIENT)
Dept: OBGYN | Facility: CLINIC | Age: 41
End: 2021-05-10
Payer: COMMERCIAL

## 2021-05-10 VITALS
DIASTOLIC BLOOD PRESSURE: 70 MMHG | HEIGHT: 66 IN | SYSTOLIC BLOOD PRESSURE: 110 MMHG | WEIGHT: 144 LBS | BODY MASS INDEX: 23.14 KG/M2

## 2021-05-10 PROCEDURE — 99072 ADDL SUPL MATRL&STAF TM PHE: CPT

## 2021-05-10 PROCEDURE — 99213 OFFICE O/P EST LOW 20 MIN: CPT

## 2021-05-10 NOTE — HISTORY OF PRESENT ILLNESS
[FreeTextEntry1] : 40 year old female presents for visit.  She was diagnosed in January with diastasis recti.  She had 2 appointments with the physical therapist and was shown exercises to do at home.  She states she tries to do the exercises as frequent as possible but does not do them daily.  She noticed 2 weeks ago that she felt something pop out of her belly button.  She was able to push it back in.  States it was not painful.  She noticed that it happened again last week.  States it is not painful when it happens and it is always reducible.  She states it is not associated with heavy lifting or exercising.

## 2021-05-10 NOTE — PLAN
[FreeTextEntry1] : 40 year old female with small umbilical hernia.  We discussed umbilical hernias and she was counseled.  Offered imaging but she declines at this time.  We discussed that if she needed the hernia repaired she would need to see a general surgeon.  The opportunity was given to ask questions and all were answered to her satisfaction.  She will call if she decides to get imaging done.  We discussed call parameters.

## 2021-08-16 NOTE — H&P PST ADULT - BACK EXAM
Average sugar shows much improved.  Blood chemistries are stable.  I want to encourage you to take OTC vitamin B12 supplement.  Continue to do best with balanced diet.  Do not be trying to lose weight.  Stay well-hydrated.  I want you to come by to have some additional lab check B12 folate levels. normal shape/strength intact/ROM intact

## 2021-10-11 NOTE — OB RN PATIENT PROFILE - NS_PRENATALLABSOURCEGBS1PN_OBGYN_ALL_OB
Patient is currently on Cefazolin 1g Q12H for MSSA bacteremia (10/8 BCx). Patient's SCr is 1.13 (10/10/21) and calculated CrCl is 73 ml/min. Per discussion with family, no blood draws since 10/10/21, however SCr has been stable historically and throughout admission. Recommend dose adjusting Cefazolin to 2g Q8H to appropriately treat MSSA bacteremia. Discussed with CICU team.    Patient is currently on alprazolam 0.5mg PO PRN as well as lorazepam 0.25mg IVP Q4H PRN. Since both are benzodiazepines (duplicate therapy) and patient does not have PO access, recommend continuing with lorazepam and discontinuing alprazolam. Discussed with CICU team.    Lin Kumar, PharmD, [facility]  Clinical Pharmacy Specialist  [phone] or Teams unknown

## 2021-10-15 NOTE — DISCHARGE NOTE OB - NS AS DC AMI YN
[facility] Cardiology  OFFICE NOTE    Cardiovascular Medicine  Andrey Dubon M.D., RPVI         No referring provider defined for this encounter.    Thank you for asking me to see Mini Andersen for palpitations.    Palpitations       This is a 66 y.o. female with:    1. Palpitations    2. Pure hypercholesterolemia    3. Obstructive sleep apnea syndrome    4. Deep vein thrombosis (DVT) of femoral vein of both lower extremities, unspecified chronicity (CMS/[facility])    5. Type 2 diabetes mellitus without complication, without long-term current use of insulin (CMS/[facility])          Chief complaint -Palpitations        History of present Illness- 66 y.o.-year-old lady with history of diabetes, hypertension and hyperlipidemia had bilateral knee replacement surgery and subsequently she developed DVT and she had DVT before and after surgery and she is going to be on chronic anticoagulation.      Patient had palpitations after her surgery, when she would get up and walk around.  Most likely sinus tachycardia, no diagnosis of arrhythmias, subsequently patient was started on verapamil with improvement in her symptoms.  However patient is here for follow-up and reported that she has been out of her verapamil for the last 1 month without any recurrence of palpitations.  She will have occasional palpitations at night.  She wants to come off the verapamil at this point currently denying any chest pain or shortness of breath  No bleeding problems from Eliquis    12/08/2020;  No acute issues since last visit.  Has occasional palpitations whenever she is exerting though are occasionally in the middle of the night however she has sleep apnea and does not use her CPAP religiously.  No bleeding problems on Eliquis.  No palpitations otherwise.    10/15/2021;  Is having worsening palpitations now at night.  She has not been using her CPAP regularly.  No bleeding problems from Eliquis      Review of Systems - Constitution:  Negative for weakness, weight gain and weight loss.   HENT: Negative for congestion.    Eyes: Negative for blurred vision.   Cardiovascular: As mentioned above  Respiratory: Negative for cough and hemoptysis.    Endocrine: Negative for polydipsia and polyuria.   Hematologic/Lymphatic: Negative for bleeding problem. Does not bruise/bleed easily.   Skin: Negative for flushing.   Musculoskeletal: Negative for neck pain and stiffness.   Gastrointestinal: Negative for abdominal pain, diarrhea, jaundice, melena, nausea and vomiting.   Genitourinary: Negative for dysuria and hematuria.   Neurological: Negative for dizziness, focal weakness and numbness.   Psychiatric/Behavioral: Negative for altered mental status and depression.          All other systems were reviewed and were negative.    family history includes Cancer in her brother; Diabetes in her brother and sister; Heart disease in her brother; Hypertension in her brother; Osteoporosis in her mother.     reports that she quit smoking about 41 years ago. She started smoking about 51 years ago. She smoked 0.00 packs per day for 0.00 years. She has never used smokeless tobacco. She reports that she does not drink alcohol and does not use drugs.    Allergies   Allergen Reactions   • Bextra [Valdecoxib] Shortness Of Breath     Shortness of breath   • Cephalosporins Shortness Of Breath   • Detrol La [Tolterodine Tartrate Er] Shortness Of Breath   • Dicyclomine Shortness Of Breath     Shortness of breath   • Fluoxetine Shortness Of Breath   • Keflex [Cephalexin] Shortness Of Breath   • Toprol Xl [Metoprolol] Anaphylaxis   • Chocolate Flavor    • Omeprazole-Sodium Bicarbonate Unknown (See Comments)   • Other      Fresh flowers causes throat swelling   • Amoxicillin Rash   • Aspirin Rash   • Claritin-D 12 Hour [Loratadine-Pseudoephedrine Er] Rash   • Codeine Rash   • Demerol [Meperidine] Rash   • Levaquin [Levofloxacin] Rash     Was told not allergic   • Lipitor  [Atorvastatin] Other (See Comments)     cramping   • Macrobid [Nitrofurantoin] Rash   • Morphine And Related Rash   • Paxil [Paroxetine Hcl] Rash   • Sulfa Antibiotics Rash   • Tape Rash   • Tramadol Rash   • Vioxx [Rofecoxib] Rash   • Zegerid [Omeprazole] Rash   • Zoloft [Sertraline Hcl] Rash         Current Outpatient Medications:   •  Alcohol Swabs (B-D SINGLE USE SWABS REGULAR) pads, , Disp: , Rfl:   •  apixaban (ELIQUIS) 5 MG tablet tablet, Take 1 tablet by mouth Every 12 (Twelve) Hours., Disp: 180 tablet, Rfl: 3  •  Calcium Citrate (CITRACAL PO), Take 2 tablets by mouth Daily., Disp: , Rfl:   •  carboxymethylcellulose (REFRESH PLUS) 0.5 % solution, Administer 1 drop to both eyes Daily As Needed for Dry Eyes., Disp: , Rfl:   •  clonazePAM (KlonoPIN) 0.5 MG tablet, Take 0.5 mg by mouth 2 (Two) Times a Day As Needed for Anxiety., Disp: , Rfl:   •  Cyanocobalamin (B-12) 1000 MCG sublingual tablet, Place 1 tablet under the tongue Daily., Disp: , Rfl:   •  cyclobenzaprine (FLEXERIL) 10 MG tablet, TAKE ONE TABLET BY MOUTH EVERY EIGHT HOURS AS NEEDED FOR MUSCLE SPASMS FOR UP TO TEN DAYS, Disp: , Rfl:   •  dexlansoprazole (Dexilant) 60 MG capsule, Take 1 capsule by mouth., Disp: , Rfl:   •  diphenhydrAMINE (BENADRYL) 25 mg capsule, Take 25 mg by mouth Every 6 (Six) Hours As Needed., Disp: , Rfl: 6  •  ferrous sulfate 324 (65 Fe) MG tablet delayed-release EC tablet, Take 324 mg by mouth Daily With Breakfast., Disp: , Rfl:   •  levothyroxine (SYNTHROID, LEVOTHROID) 50 MCG tablet, Take 50 mcg by mouth daily., Disp: , Rfl: 1  •  losartan (COZAAR) 25 MG tablet, Take 25 mg by mouth Daily., Disp: , Rfl:   •  metFORMIN (GLUCOPHAGE) 500 MG tablet, Take 500 mg by mouth Daily As Needed (For FSBS > 140)., Disp: , Rfl: 1  •  omeprazole (priLOSEC) 20 MG capsule, Take 20 mg by mouth Daily., Disp: , Rfl:   •  ondansetron (ZOFRAN) 4 MG tablet, Take 4 mg by mouth Every 8 (Eight) Hours As Needed for Nausea or Vomiting., Disp: , Rfl:   •   "ondansetron ODT (ZOFRAN-ODT) 4 MG disintegrating tablet, take 1 Tablet by oral route  every 8 hours as needed for nausea, Disp: , Rfl:   •  ONE TOUCH ULTRA TEST test strip, TEST BID, Disp: , Rfl: 5  •  promethazine (PHENERGAN) 25 MG tablet, , Disp: , Rfl:   •  TRUEplus Lancets 28G misc, , Disp: , Rfl:   •  vitamin D (ERGOCALCIFEROL) 19096 UNITS capsule capsule, Take 50,000 Units by mouth Every 7 (Seven) Days., Disp: , Rfl: 1    Physical Exam:  Vitals:    10/15/21 1004   BP: 122/76   BP Location: Left arm   Patient Position: Sitting   Cuff Size: Adult   Pulse: 71   SpO2: 99%   Weight: 100 kg (221 lb)   Height: 182.9 cm (72\")   PainSc: 0-No pain     Current Pain Level: none  Pulse Ox: Normal  on room air  General: alert, appears stated age and cooperative     Body Habitus: well-nourished    HEENT: Head: Normocephalic, no lesions, without obvious abnormality. No arcus senilis, xanthelasma or xanthomas.    Neuro: alert, oriented x3  Pulses: 2+ and symmetric  JVP: Volume/Pulsation: Normal.  Normal waveforms.   Appropriate inspiratory decrease.  No Kussmaul's. No Padmaja's.   Carotid Exam: no bruit normal pulsation bilaterally   Carotid Volume: normal.     Respirations: no increased work of breathing   Chest:  Normal    Pulmonary:Normal   Precordium: Normal impulses. P2 is not palpable.  RV Heave: absent  LV Heave: absent  [place]:  normal size and placement  Palpable S4: absent.  Heart rate: normal    Heart Rhythm: regular     Heart Sounds: S1: normal  S2: normal  S3: absent   S4: absent  Opening Snap: absent    Pericardial Rub:  Absent: .    Abdomen:   Appearance: normal .  Palpation: Soft, non-tender to palpation, bowel sounds positive in all four quadrants; no guarding or rebound tenderness  Extremity: no edema.   LE Skin: no rashes  LE Hair:  normal  LE Pulses: well perfused with normal pulses in the distal extremities  Pallor on elevation: Absent. Rubor on dependency: None      DATA REVIEWED:     EKG. I personally " reviewed and interpreted the EKG.  Normal sinus rhythm normal EKG.    ECG/EMG Results (all)     None        ---------------------------------------------------  TTE/RORY:  Results for orders placed in visit on 07/10/17    Adult Transthoracic Echo Complete    Interpretation Summary  · Left ventricular systolic function is normal. Estimated EF = 55%.  · Left ventricular diastolic dysfunction (grade I) consistent with impaired relaxation.  · Mild tricuspid valve regurgitation is present.      --------------------------------------------------------------------------------------------------  LABS:     The 10-year CVD risk score (Viki et al., 2008) is: 23.1%    Values used to calculate the score:      Age: 64 years      Sex: Female      Diabetic: Yes      Tobacco smoker: No      Systolic Blood Pressure: 138 mmHg      Is BP treated: No      HDL Cholesterol: 46 mg/dL      Total Cholesterol: 241 mg/dL         Lab Results   Component Value Date    GLUCOSE 106 (H) 07/21/2019    BUN 10 07/12/2021    CREATININE 0.8 07/12/2021    EGFRIFNONA 77 07/21/2019    EGFRIFAFRI 87 07/12/2021    BCR 13.2 07/21/2019    K 3.9 07/12/2021    CO2 27 07/12/2021    CALCIUM 9.3 07/12/2021    ALBUMIN 3.9 07/12/2021    AST 20 07/12/2021    ALT 20 07/12/2021     Lab Results   Component Value Date    WBC 5.4 10/21/2019    HGB 13.2 10/21/2019    HCT 39.3 10/21/2019    MCV 88.6 10/21/2019     10/21/2019     Lab Results   Component Value Date    CHOL 241 (H) 11/11/2019    CHLPL 214 (H) 07/12/2021    TRIG 124 07/12/2021    HDL 40 07/12/2021     07/12/2021     Lab Results   Component Value Date    TSH 1.34 07/12/2021     Lab Results   Component Value Date    CKTOTAL 74 09/18/2017    CKMB 0.96 09/18/2017    TROPONINI <0.012 09/18/2017     Lab Results   Component Value Date    HGBA1C 6.1 (H) 07/12/2021     Lab Results   Component Value Date    DDIMER 715 (H) 09/01/2015     Lab Results   Component Value Date    ALT 20 07/12/2021     Lab  Results   Component Value Date    HGBA1C 6.1 (H) 07/12/2021    HGBA1C 5.7 12/03/2020    HGBA1C 6.4 (H) 06/10/2020     Lab Results   Component Value Date    CREATININE 0.8 07/12/2021     Lab Results   Component Value Date    IRON 36 03/14/2019     Lab Results   Component Value Date    INR 1.02 10/01/2019    INR 1.03 09/18/2017    INR 0.97 09/16/2017    PROTIME 10.6 10/01/2019    PROTIME 13.4 09/18/2017    PROTIME 12.8 09/16/2017       Interpretation Summary    · A relatively benign monitor study.  · Patient had a minimum heart of 57 bpm, maximal heart rate of 167 bpm and average heart rate of 88 bpm. Predominant underlying rhythm was sinus rhythm 7 supraventricular tachycardia runs occurred, the run with the fastest interval lasting 5 beats with a max rate of 167 bpm, the longest lasting 9 beats an average rate of 124 bpm.  · PACs were rare less than 1% PVCs were rate less than 1%  · Normal diurnal variation heart rate.  · Diary event of arm neck pain, chest pain, fluttering, anxiety, shortness of breath and lightheadedness were associated with normal sinus rhythm  · One episode of chest pounding was associated with sinus tachycardia.  · Patient did not have triggered events with her SVT.  · Patient had total of 13 triggered events which were associated with normal sinus rhythm or sinus tachycardia.         Assessment/Plan     1.  Palpitations:  She has worsening symptoms now.  We will repeat her monitor.  Recent TSH electrolytes are okay  Palpitations and exertional sinus tachycardia in and that at the night mostly from her sleep apnea.  Visor to exercise on regular basis, weight loss and religiously use her CPAP     2.  Diabetes on metformin and she is doing okay and follows with Dr. Aquino     3.  Hyperlipidemia had dizziness from Lipitor.  Will try Crestor in the setting of her elevated ASCVD risk score     4.  Osteoarthritis status post knee replacement doing okay     5.  Hypothyroidism on Synthroid  supplements.    6.  Hypertension:  Well-controlled on current regimen losartan 25 mg    7.  DVT:  On chronic anticoagulation with Eliquis.  Recent CBC with normal hemoglobin  She has an IVC filter from before, referred her to surgery for potential retrieval of her IVC but she is recommended to keep it in place.         Prevention:  Patient's Body mass index is 29.97 kg/m². BMI is above normal parameters. Recommendations include: exercise counseling and nutrition counseling.      Mini Andersen is a former smoker      AAA Screening:   Not needed          This document has been electronically signed by Andrey Dubon MD on October 15, 2021 10:06 CDT               no

## 2022-02-18 ENCOUNTER — APPOINTMENT (OUTPATIENT)
Dept: OBGYN | Facility: CLINIC | Age: 42
End: 2022-02-18
Payer: COMMERCIAL

## 2022-02-18 VITALS
WEIGHT: 145 LBS | HEIGHT: 66 IN | SYSTOLIC BLOOD PRESSURE: 116 MMHG | DIASTOLIC BLOOD PRESSURE: 74 MMHG | BODY MASS INDEX: 23.3 KG/M2

## 2022-02-18 DIAGNOSIS — Q99.9 CHROMOSOMAL ABNORMALITY, UNSPECIFIED: ICD-10-CM

## 2022-02-18 DIAGNOSIS — O09.523 SUPERVISION OF ELDERLY MULTIGRAVIDA, THIRD TRIMESTER: ICD-10-CM

## 2022-02-18 DIAGNOSIS — O34.219 MATERNAL CARE FOR UNSPECIFIED TYPE SCAR FROM PREVIOUS CESAREAN DELIVERY: ICD-10-CM

## 2022-02-18 DIAGNOSIS — R89.8 OTHER ABNORMAL FINDINGS IN SPECIMENS FROM OTHER ORGANS, SYSTEMS AND TISSUES: ICD-10-CM

## 2022-02-18 DIAGNOSIS — N94.89 OTHER SPECIFIED CONDITIONS ASSOCIATED WITH FEMALE GENITAL ORGANS AND MENSTRUAL CYCLE: ICD-10-CM

## 2022-02-18 DIAGNOSIS — O30.043 TWIN PREGNANCY, DICHORIONIC/DIAMNIOTIC, THIRD TRIMESTER: ICD-10-CM

## 2022-02-18 DIAGNOSIS — Z87.898 PERSONAL HISTORY OF OTHER SPECIFIED CONDITIONS: ICD-10-CM

## 2022-02-18 DIAGNOSIS — L29.9 DISEASES OF THE SKIN AND SUBCUTANEOUS TISSUE COMPLICATING PREGNANCY, THIRD TRIMESTER: ICD-10-CM

## 2022-02-18 DIAGNOSIS — Z67.91 OTHER SPECIFIED PREGNANCY RELATED CONDITIONS, UNSPECIFIED TRIMESTER: ICD-10-CM

## 2022-02-18 DIAGNOSIS — O30.049 TWIN PREGNANCY, DICHORIONIC/DIAMNIOTIC, UNSPECIFIED TRIMESTER: ICD-10-CM

## 2022-02-18 DIAGNOSIS — O26.613 LIVER AND BILIARY TRACT DISORDERS IN PREGNANCY, THIRD TRIMESTER: ICD-10-CM

## 2022-02-18 DIAGNOSIS — O26.899 OTHER SPECIFIED PREGNANCY RELATED CONDITIONS, UNSPECIFIED TRIMESTER: ICD-10-CM

## 2022-02-18 DIAGNOSIS — O99.713 DISEASES OF THE SKIN AND SUBCUTANEOUS TISSUE COMPLICATING PREGNANCY, THIRD TRIMESTER: ICD-10-CM

## 2022-02-18 DIAGNOSIS — K83.1 LIVER AND BILIARY TRACT DISORDERS IN PREGNANCY, THIRD TRIMESTER: ICD-10-CM

## 2022-02-18 DIAGNOSIS — Z3A.35 35 WEEKS GESTATION OF PREGNANCY: ICD-10-CM

## 2022-02-18 PROCEDURE — 99395 PREV VISIT EST AGE 18-39: CPT

## 2022-02-18 PROCEDURE — 99213 OFFICE O/P EST LOW 20 MIN: CPT | Mod: 25

## 2022-02-18 NOTE — PHYSICAL EXAM
[Chaperone Declined] : Patient declined chaperone [Appropriately responsive] : appropriately responsive [Alert] : alert [No Acute Distress] : no acute distress [No Lymphadenopathy] : no lymphadenopathy [Regular Rate Rhythm] : regular rate rhythm [No Murmurs] : no murmurs [Clear to Auscultation B/L] : clear to auscultation bilaterally [Soft] : soft [Non-tender] : non-tender [Non-distended] : non-distended [No HSM] : No HSM [No Lesions] : no lesions [No Mass] : no mass [Oriented x3] : oriented x3 [Examination Of The Breasts] : a normal appearance [No Masses] : no breast masses were palpable [Labia Majora] : normal [Labia Minora] : normal [Moderate] : There was moderate vaginal bleeding [Normal] : normal [Uterine Adnexae] : normal

## 2022-02-18 NOTE — HISTORY OF PRESENT ILLNESS
[FreeTextEntry1] : 41 year old female presents for wwe.  She would like to discuss her menses today.  Prior to the new year, her menses were coming every 25-26 days.  She states she was a week late in January.  In February she had a 19 day cycle.  She states she typically bleeds for 5 days but has noticed that the bleeding has been heavier.  She is not soaking through more than one pad per hour.  States the cramping is worse than usual as well.  She is taking tylenol with some relief.  She denies intermenstrual spotting.  Her  had a vasectomy.  She is also complaining of hair loss since the birth of her twins who are now 1.5 years old.  She has a family history of breast cancer in her maternal great grandmother in her 50's.  Her mother and maternal grandmother had DCIS both in their 50's.  She does not think any of her family members have had genetic testing.  She has a history of an abnormal pap.

## 2022-02-18 NOTE — PLAN
[FreeTextEntry1] : 32 year old female wwe\par \par 1. Pap done\par 2. SBE reviewed\par 3. Family history of breast cancer - not a candidate for genetic testing.  Will start screening mammo;s at age 42. \par 4. Irregular menses with heavy flow and dysmenorrhea.  Differential diagnosis discussed with the patient.  Will get labs (CBC, TSH) and have her RTO for tv sono and to review results.  Call parameters reviewed.  \par 5. Hair loss - differential diagnosis reviewed with the patient.  Labs ordered.  Will review at next visit.  We discussed a possible derm consult for treatment options.  \par 6. Annual exam in 1 year

## 2022-02-28 LAB
CYTOLOGY CVX/VAG DOC THIN PREP: ABNORMAL
HPV HIGH+LOW RISK DNA PNL CVX: NOT DETECTED

## 2022-03-29 ENCOUNTER — APPOINTMENT (OUTPATIENT)
Dept: OBGYN | Facility: CLINIC | Age: 42
End: 2022-03-29

## 2022-03-30 ENCOUNTER — APPOINTMENT (OUTPATIENT)
Dept: ANTEPARTUM | Facility: CLINIC | Age: 42
End: 2022-03-30
Payer: COMMERCIAL

## 2022-03-30 ENCOUNTER — ASOB RESULT (OUTPATIENT)
Age: 42
End: 2022-03-30

## 2022-03-30 PROCEDURE — 76830 TRANSVAGINAL US NON-OB: CPT

## 2022-03-30 PROCEDURE — 76856 US EXAM PELVIC COMPLETE: CPT | Mod: 59

## 2022-05-03 ENCOUNTER — APPOINTMENT (OUTPATIENT)
Dept: OBGYN | Facility: CLINIC | Age: 42
End: 2022-05-03

## 2022-05-03 VITALS
SYSTOLIC BLOOD PRESSURE: 100 MMHG | WEIGHT: 146 LBS | DIASTOLIC BLOOD PRESSURE: 70 MMHG | HEIGHT: 66 IN | BODY MASS INDEX: 23.46 KG/M2

## 2022-05-03 PROCEDURE — 99213 OFFICE O/P EST LOW 20 MIN: CPT | Mod: 25

## 2022-05-19 LAB
CYTOLOGY CVX/VAG DOC THIN PREP: ABNORMAL
HPV HIGH+LOW RISK DNA PNL CVX: NOT DETECTED

## 2022-07-29 NOTE — HISTORY OF PRESENT ILLNESS
[FreeTextEntry1] : 41 year old female presents for repeat Pap.  She had her annual exam done February 18.  Pap was unsatisfactory for evaluation due to obscuring blood.  She is here for repeat Pap today.  She has a history of abnormal Paps.  She has no complaints today.\par \par She is also here to discuss her recent blood work and sono results.  At her annual exam she had stated that her menses were coming every 25 to 26 days.  In January and February her cycles were slightly irregular but now have regulated again.  She typically bleeds for 5 days but has noticed that the bleeding has been heavier.  She is not soaking through more than 1 pad per hour.  She also noted that the cramping was worse than normal.  She takes Tylenol with some relief.  She denies intermenstrual spotting.  She was also complaining of hair loss since the birth of her twins.

## 2022-07-29 NOTE — PLAN
[FreeTextEntry1] : 41-year-old female for \par \par 1. Repeat Pap.  Pap performed.  Will call if abnormal.  \par \par 2.  Heavy menstrual bleeding and cramping.  Blood work reviewed with the patient.  CBC and TSH within normal limits.  Sonogram reviewed with the patient and no GYN pathology noted.  Treatment options were reviewed with the patient.  She is not interested in any treatment options at this time.  She will continue with observation.  Call parameters reviewed.\par \par 3.  Hair loss.  Labs reviewed with the patient.  No abnormalities noted.  Recommend follow-up with dermatology.\par \par The patient was given the opportunity to ask questions and all were answered to her satisfaction.\par

## 2022-07-29 NOTE — PHYSICAL EXAM
[Chaperone Declined] : Patient declined chaperone [Appropriately responsive] : appropriately responsive [Alert] : alert [No Acute Distress] : no acute distress [Soft] : soft [Non-tender] : non-tender [Non-distended] : non-distended [No HSM] : No HSM [No Lesions] : no lesions [No Mass] : no mass [Oriented x3] : oriented x3 [Labia Majora] : normal [Labia Minora] : normal [Normal] : normal [Uterine Adnexae] : normal

## 2023-07-05 ENCOUNTER — APPOINTMENT (OUTPATIENT)
Dept: OBGYN | Facility: CLINIC | Age: 43
End: 2023-07-05
Payer: COMMERCIAL

## 2023-07-05 ENCOUNTER — NON-APPOINTMENT (OUTPATIENT)
Age: 43
End: 2023-07-05

## 2023-07-05 VITALS
HEIGHT: 66 IN | BODY MASS INDEX: 22.5 KG/M2 | SYSTOLIC BLOOD PRESSURE: 103 MMHG | WEIGHT: 140 LBS | DIASTOLIC BLOOD PRESSURE: 65 MMHG

## 2023-07-05 DIAGNOSIS — Z01.419 ENCOUNTER FOR GYNECOLOGICAL EXAMINATION (GENERAL) (ROUTINE) W/OUT ABNORMAL FINDINGS: ICD-10-CM

## 2023-07-05 PROCEDURE — 99396 PREV VISIT EST AGE 40-64: CPT

## 2023-07-05 PROCEDURE — 99212 OFFICE O/P EST SF 10 MIN: CPT | Mod: 25

## 2023-07-05 NOTE — HISTORY OF PRESENT ILLNESS
[FreeTextEntry1] : 42 year old female presents for wwe.  She would like to discuss her menses today.  Menses have been every 16 to 30 days, lasting 5 days.  She does get 1 day of very heavy bleeding.  She denies cramping.  She denies intermenstrual spotting.  She had a work-up last year that was negative but is interested in having another work-up done.  Her GYN history is significant for an abnormal Pap.  Her  had a vasectomy.   She has a family history of breast cancer in her maternal great grandmother in her 50's.  Her mother and maternal grandmother had DCIS both in their 50's.  She does not think any of her family members have had genetic testing.

## 2023-07-05 NOTE — PLAN
[FreeTextEntry1] : 42 year old female wwe\par \par 1. Pap done\par 2.  Rx screening mammo\par 3. Family history of breast cancer - not a candidate for genetic testing.  \par 4. Irregular menses with heavy flow and dysmenorrhea.  Differential diagnosis discussed with the patient.  Will get labs (CBC, TSH, FSH and estradiol) and have her RTO for tv sono and to review results.  Call parameters reviewed.  \par 5. Annual exam in 1 year

## 2023-07-11 LAB
CYTOLOGY CVX/VAG DOC THIN PREP: ABNORMAL
HPV HIGH+LOW RISK DNA PNL CVX: NOT DETECTED

## 2023-08-10 ENCOUNTER — EMERGENCY (EMERGENCY)
Facility: HOSPITAL | Age: 43
LOS: 1 days | Discharge: HOME | End: 2023-08-10
Attending: EMERGENCY MEDICINE
Payer: COMMERCIAL

## 2023-08-10 VITALS
RESPIRATION RATE: 18 BRPM | SYSTOLIC BLOOD PRESSURE: 124 MMHG | HEART RATE: 71 BPM | OXYGEN SATURATION: 100 % | TEMPERATURE: 99 F | DIASTOLIC BLOOD PRESSURE: 77 MMHG

## 2023-08-10 VITALS
WEIGHT: 134.92 LBS | SYSTOLIC BLOOD PRESSURE: 116 MMHG | RESPIRATION RATE: 20 BRPM | HEIGHT: 66 IN | OXYGEN SATURATION: 99 % | DIASTOLIC BLOOD PRESSURE: 78 MMHG | TEMPERATURE: 98 F | HEART RATE: 66 BPM

## 2023-08-10 DIAGNOSIS — Z90.89 ACQUIRED ABSENCE OF OTHER ORGANS: Chronic | ICD-10-CM

## 2023-08-10 DIAGNOSIS — Z98.890 OTHER SPECIFIED POSTPROCEDURAL STATES: Chronic | ICD-10-CM

## 2023-08-10 DIAGNOSIS — M25.9 JOINT DISORDER, UNSPECIFIED: Chronic | ICD-10-CM

## 2023-08-10 DIAGNOSIS — Z30.430 ENCOUNTER FOR INSERTION OF INTRAUTERINE CONTRACEPTIVE DEVICE: Chronic | ICD-10-CM

## 2023-08-10 PROCEDURE — 93005 ELECTROCARDIOGRAM TRACING: CPT

## 2023-08-10 PROCEDURE — 70450 CT HEAD/BRAIN W/O DYE: CPT | Mod: 26,MF

## 2023-08-10 PROCEDURE — G1004: CPT

## 2023-08-10 PROCEDURE — 93010 ELECTROCARDIOGRAM REPORT: CPT

## 2023-08-10 PROCEDURE — 99284 EMERGENCY DEPT VISIT MOD MDM: CPT | Mod: 25

## 2023-08-10 PROCEDURE — 99284 EMERGENCY DEPT VISIT MOD MDM: CPT

## 2023-08-10 PROCEDURE — 96372 THER/PROPH/DIAG INJ SC/IM: CPT

## 2023-08-10 PROCEDURE — 70450 CT HEAD/BRAIN W/O DYE: CPT | Mod: MF

## 2023-08-10 RX ORDER — KETOROLAC TROMETHAMINE 30 MG/ML
15 SYRINGE (ML) INJECTION ONCE
Refills: 0 | Status: DISCONTINUED | OUTPATIENT
Start: 2023-08-10 | End: 2023-08-10

## 2023-08-10 RX ORDER — KETOROLAC TROMETHAMINE 30 MG/ML
60 SYRINGE (ML) INJECTION ONCE
Refills: 0 | Status: DISCONTINUED | OUTPATIENT
Start: 2023-08-10 | End: 2023-08-10

## 2023-08-10 RX ADMIN — Medication 15 MILLIGRAM(S): at 16:39

## 2023-08-10 RX ADMIN — Medication 2 TABLET(S): at 13:46

## 2023-08-10 NOTE — ED STATDOCS - CLINICAL SUMMARY MEDICAL DECISION MAKING FREE TEXT BOX
pt with persistent headache, treatment with Fioricet, obtain CT head. pt with persistent left-sided headache and difficulty communicating with normal exam. treatment with Fioricet, obtain CT head.

## 2023-08-10 NOTE — ED ADULT NURSE NOTE - OBJECTIVE STATEMENT
Pt is here with c/o migraine and SOB.  States migraine started on Thursday and SOB with cough started yesterday.  Pt denies CP, speaking in full sentences, resp even/unlabored.  Medicated for pain as ordered.

## 2023-08-10 NOTE — ED STATDOCS - NS_ ATTENDINGSCRIBEDETAILS _ED_A_ED_FT
I, Maurilio Blanco, performed the initial face to face bedside interview with this patient regarding history of present illness, review of symptoms and relevant past medical, social and family history.  I completed an independent physical examination.  I was the provider who initially evaluated this patient.  The history, relevant review of systems, past medical and surgical history, medical decision making, and physical examination was documented by the scribe in my presence and I attest to the accuracy of the documentation. Follow-up on ordered tests (ie labs, radiologic studies) and re-evaluation of the patient's status has been communicated to the ACP.  Disposition of the patient will be based on test outcome and response to ED interventions.

## 2023-08-10 NOTE — ED ADULT TRIAGE NOTE - CHIEF COMPLAINT QUOTE
" I'm having migraine and SOB" pt states migraines started Thursday and SOB was yesterday with cough. pt denies any dizziness, N.V. chest pain, fevers, chills.

## 2023-08-10 NOTE — ED STATDOCS - PATIENT PORTAL LINK FT
You can access the FollowMyHealth Patient Portal offered by [facility] by registering at the following website: [email or web address]. By joining [employer]’s FollowMyHealth portal, you will also be able to view your health information using other applications (apps) compatible with our system. You can access the FollowMyHealth Patient Portal offered by [facility] by registering at the following website: [email or web address]. By joining [employer]’s FollowMyHealth portal, you will also be able to view your health information using other applications (apps) compatible with our system.

## 2023-08-10 NOTE — ED STATDOCS - CARE PROVIDER_API CALL
Xavi Cabral  Neurology  [address] [place] [address], [facility] [address]  [place] [address]  Phone: [phone]  Fax: [phone]  Follow Up Time:

## 2023-08-10 NOTE — ED ADULT NURSE NOTE - NSFALLUNIVINTERV_ED_ALL_ED
Bed/Stretcher in lowest position, wheels locked, appropriate side rails in place/Call bell, personal items and telephone in reach/Instruct patient to call for assistance before getting out of bed/chair/stretcher/Non-slip footwear applied when patient is off stretcher/[place] to call system/Physically safe environment - no spills, clutter or unnecessary equipment/Purposeful proactive rounding/Room/bathroom lighting operational, light cord in reach

## 2023-08-10 NOTE — ED STATDOCS - NSICDXPASTSURGICALHX_GEN_ALL_CORE_FT
PAST SURGICAL HISTORY:   delivery delivered     Encounter for IUD insertion  implanted    H/O LEEP     History of tonsillectomy     Knee problem  and 2016 right knee

## 2023-08-10 NOTE — ED STATDOCS - PHYSICAL EXAMINATION
Gen: Non-toxic appearing in NAD  Eyes: PERRL, no papilledema  ENT: no sinus percussion tenderness,  Card: regular rate and rhythm, no obvious murmur  Resp: Lungs clear bilaterally, no rales, rhonchi or wheezing  Neuro: neck supple with FROM, NIHSS=0 Gen: Non-toxic appearing in NAD  Eyes: PERRL, no papilledema  ENT: no sinus percussion tenderness,  Card: regular rate and rhythm, no obvious murmur  Resp: Lungs clear bilaterally, no rales, rhonchi or wheezing  Neuro: neck supple with FROM, NIHSS=0, normal gait

## 2023-08-10 NOTE — ED STATDOCS - OBJECTIVE STATEMENT
41 y/o female with PMHx of migraines presents to the ED c/o increasing frequency and duration of migraines. Pt notes has some "confusion" and "trouble with words" for the past  week. Pt states her migraines symptoms are usually arm and face numbness, difficulty expressing herself and left sided headache. Pt had same symptoms with migraines last Thursday but still has some symptoms. Pt usually takes Excedrin and sleeps which usually relieves symptoms. Pt also notes intermittent SOB associated with cough for the past 2 days, episodes last several minute, no known triggers. States saw neuro due to developing headaches when going from sitting to standing, has f/u next month. 41 y/o female with PMHx of migraines presents to the ED c/o increasing frequency and duration of migraines. Reports that migraines typically occur every few years but had two in the past 2 week with lingering symtpoms for the past 1 week.  Reports typical migraine includes: arm and face numbness, difficulty expressing herself, blind spots in vision and left sided headache. Pt had same symptoms with migraines last Thursday but still has some symptoms. Pt notes has some "confusion" and "trouble with words" for the past  week.  Pt usually takes Excedrin and sleeps which usually relieves symptoms. Pt also notes intermittent SOB associated with cough for the past 2 days, episodes last several minute, no known triggers. States saw neuro due to developing headaches when going from sitting to standing, has f/u 8/24.

## 2023-08-15 ENCOUNTER — ASOB RESULT (OUTPATIENT)
Age: 43
End: 2023-08-15

## 2023-08-15 ENCOUNTER — APPOINTMENT (OUTPATIENT)
Dept: ANTEPARTUM | Facility: CLINIC | Age: 43
End: 2023-08-15
Payer: COMMERCIAL

## 2023-08-15 ENCOUNTER — APPOINTMENT (OUTPATIENT)
Dept: OBGYN | Facility: CLINIC | Age: 43
End: 2023-08-15
Payer: COMMERCIAL

## 2023-08-15 VITALS
WEIGHT: 135 LBS | DIASTOLIC BLOOD PRESSURE: 68 MMHG | HEIGHT: 66 IN | SYSTOLIC BLOOD PRESSURE: 111 MMHG | BODY MASS INDEX: 21.69 KG/M2

## 2023-08-15 DIAGNOSIS — O09.899 ABNORMAL HEMATOLOGICAL FINDING ON ANTENATAL SCREENING OF MOTHER: ICD-10-CM

## 2023-08-15 DIAGNOSIS — Z23 ENCOUNTER FOR IMMUNIZATION: ICD-10-CM

## 2023-08-15 DIAGNOSIS — R87.611 ATYPICAL SQUAMOUS CELLS CANNOT EXCLUDE HIGH GRADE SQUAMOUS INTRAEPITHELIAL LESION ON CYTOLOGIC SMEAR OF CERVIX (ASC-H): ICD-10-CM

## 2023-08-15 DIAGNOSIS — O26.892 OTHER SPECIFIED PREGNANCY RELATED CONDITIONS, SECOND TRIMESTER: ICD-10-CM

## 2023-08-15 DIAGNOSIS — O28.9 UNSPECIFIED ABNORMAL FINDINGS ON ANTENATAL SCREENING OF MOTHER: ICD-10-CM

## 2023-08-15 DIAGNOSIS — Z86.19 PERSONAL HISTORY OF OTHER INFECTIOUS AND PARASITIC DISEASES: ICD-10-CM

## 2023-08-15 DIAGNOSIS — O28.0 ABNORMAL HEMATOLOGICAL FINDING ON ANTENATAL SCREENING OF MOTHER: ICD-10-CM

## 2023-08-15 DIAGNOSIS — Z97.5 PRESENCE OF (INTRAUTERINE) CONTRACEPTIVE DEVICE: ICD-10-CM

## 2023-08-15 DIAGNOSIS — Z30.431 ENCOUNTER FOR ROUTINE CHECKING OF INTRAUTERINE CONTRACEPTIVE DEVICE: ICD-10-CM

## 2023-08-15 DIAGNOSIS — R87.615 UNSATISFACTORY CYTOLOGIC SMEAR OF CERVIX: ICD-10-CM

## 2023-08-15 DIAGNOSIS — N92.0 EXCESSIVE AND FREQUENT MENSTRUATION WITH REGULAR CYCLE: ICD-10-CM

## 2023-08-15 DIAGNOSIS — N92.6 IRREGULAR MENSTRUATION, UNSPECIFIED: ICD-10-CM

## 2023-08-15 DIAGNOSIS — Z67.91 OTHER SPECIFIED PREGNANCY RELATED CONDITIONS, SECOND TRIMESTER: ICD-10-CM

## 2023-08-15 DIAGNOSIS — B97.7 PAPILLOMAVIRUS AS THE CAUSE OF DISEASES CLASSIFIED ELSEWHERE: ICD-10-CM

## 2023-08-15 DIAGNOSIS — Z12.4 ENCOUNTER FOR SCREENING FOR MALIGNANT NEOPLASM OF CERVIX: ICD-10-CM

## 2023-08-15 DIAGNOSIS — R87.613 HIGH GRADE SQUAMOUS INTRAEPITHELIAL LESION ON CYTOLOGIC SMEAR OF CERVIX (HGSIL): ICD-10-CM

## 2023-08-15 DIAGNOSIS — Z87.410 PERSONAL HISTORY OF CERVICAL DYSPLASIA: ICD-10-CM

## 2023-08-15 DIAGNOSIS — L65.9 NONSCARRING HAIR LOSS, UNSPECIFIED: ICD-10-CM

## 2023-08-15 DIAGNOSIS — Z87.42 PERSONAL HISTORY OF OTHER DISEASES OF THE FEMALE GENITAL TRACT: ICD-10-CM

## 2023-08-15 DIAGNOSIS — Z87.19 PERSONAL HISTORY OF OTHER DISEASES OF THE DIGESTIVE SYSTEM: ICD-10-CM

## 2023-08-15 PROCEDURE — 76830 TRANSVAGINAL US NON-OB: CPT

## 2023-08-15 PROCEDURE — 99214 OFFICE O/P EST MOD 30 MIN: CPT

## 2023-08-15 PROCEDURE — 76856 US EXAM PELVIC COMPLETE: CPT | Mod: 59

## 2023-08-15 NOTE — PLAN
[FreeTextEntry1] : 42-year-old female with history of irregular and heavy menses.  Patient has not yet had a chance to go for the blood work.  The patient plans to go for the blood work soon.  We will call with the results.  Sonogram reviewed with the patient.  No GYN pathology noted.  We discussed treatment options including observation versus lysteda versus estrogen and progesterone containing options versus progesterone only options.  The patient would like to think about her options at this time.  She is not sure if she would like to start medication or hormones.  We discussed that if her blood work comes back normal observation would be a viable option.  If her blood work comes back abnormal we will discuss further.  The patient was given the opportunity to ask questions and all were answered to her satisfaction.

## 2023-08-15 NOTE — HISTORY OF PRESENT ILLNESS
[FreeTextEntry1] : 42 year old female presents for blood work and sono results.  She did not get to go for the blood work yet.   Menses have been every 16 to 30 days, lasting 5 days.  She does get 1 day of very heavy bleeding.  She denies cramping.  She denies intermenstrual spotting.  She had a work-up last year that was negative but is interested in having another work-up done.  Her GYN history is significant for an abnormal Pap.  Her  had a vasectomy.   She is unsure if she wants to start meds to help with her cycles.

## 2023-08-30 NOTE — H&P PST ADULT - NEGATIVE CARDIOVASCULAR SYMPTOMS
Additional Notes: Monitor for reoccurrence
Render Risk Assessment In Note?: no
Detail Level: Zone
Additional Notes: Pt is aware she needs to schedule MOHS.\\nTask sent to Dr. Zamarripa scheduling team to schedule.
no paroxysmal nocturnal dyspnea/no chest pain/no palpitations/no claudication/no orthopnea/no peripheral edema

## 2023-10-06 RX ORDER — FLUCONAZOLE 150 MG/1
150 TABLET ORAL
Qty: 1 | Refills: 0 | Status: ACTIVE | COMMUNITY
Start: 2023-10-06 | End: 1900-01-01

## 2024-06-12 NOTE — OB PROVIDER H&P - NS_FHRACCEL_OBGYN_ALL_OB
Recommend getting tetanus, diptheria, pertussis vaccine.    For vaginal nodule: likely a blocked pore/cyst. Try soaking in a bath with Epsom salt or applying warm compress on area.  
Present (15 x15 bpm)

## 2024-07-16 NOTE — DISCHARGE NOTE OB - NS OB DC TDAP REASON NOT RECEIVED
Patient presents to the ED after being called that her liver enzymes are out of the place and was sent in for imaging. Nausea present. Patient's friend in the room noticed that she's had some coordination issues too.   Contraindicated

## 2024-08-23 NOTE — OB RN PATIENT PROFILE - NSNAMEOFMDOFFICE_OBGYN_ALL_OB_FT
H&P for Gastroenterology Procedure.     Procedure Date:  2024    Patient: Isma Ly  : 1977    Sedation: MAC    History and Physical Review for Colonoscopy    Indications: Screening (no prior colonoscopy)    Family Hx (1st degree) of Colon Ca or Adv Polyp: No    Current Outpatient Medications   Medication Sig Dispense Refill    sildenafil (VIAGRA) 25 MG tablet       minoxidil (LONITEN) 2.5 MG tablet       FINASTERIDE PO        No current facility-administered medications for this encounter.     Facility-Administered Medications Ordered in Other Encounters   Medication Dose Route Frequency Provider Last Rate Last Admin    lidocaine HCl 2 % injection   Intravenous PRN Mann Randall, CRNA   5 mL at 24 0810    propofol (DIPRIVAN) infusion   Intravenous Continuous PRN Yoselin Randallrice T, CRNA 53.1 mL/hr at 24 0810 100 mcg/kg/min at 24 0810    sodium chloride 0.9% infusion   Intravenous Continuous PRN Nacho Randalle T, CRNA   New Bag at 24 0759       ALLERGIES:  Patient has no known allergies.            Past Medical History:   Diagnosis Date    High blood cholesterol 2018    Last Assessment & Plan:  Formatting of this note might be different from the original. Check lipids    History of pneumothorax 3/28/2023     Past Surgical History:   Procedure Laterality Date    Chest tube insertion Bilateral        Family History   Problem Relation Age of Onset    Cancer Mother         bladder    Diabetes Mother     Patient is unaware of any medical problems Father     Multiple Sclerosis Sister          PHYSICAL EXAM:  General: Non-toxic appearing, in no acute distress  HEENT: No scleral icterus, moist mucous membranes  Lungs: Clear to auscultation bilaterally, comfortably breathing on room air  Cardiac: Regular rate and rhythm  Abdomen: Soft, non-tender, non-distended, no rebound or guarding  Neuro: Alert and oriented. No focal deficits. CN 2-12 grossly  intact  Psych: Appropriate affect, recall intact    Mallampati Score: II (hard and soft palate, upper portion of tonsils anduvula visible)    ASA Classification: 2    The risks of the procedure including the possibility of bleeding, perforation (possibly resulting in laparotomy/colostomy), aspiration, and missed polyp were discussed with the patient who accepts these risks.     Moy L

## 2024-11-19 ENCOUNTER — APPOINTMENT (OUTPATIENT)
Dept: OBGYN | Facility: CLINIC | Age: 44
End: 2024-11-19
Payer: COMMERCIAL

## 2024-11-19 VITALS
WEIGHT: 126 LBS | DIASTOLIC BLOOD PRESSURE: 70 MMHG | SYSTOLIC BLOOD PRESSURE: 110 MMHG | HEIGHT: 66 IN | BODY MASS INDEX: 20.25 KG/M2

## 2024-11-19 DIAGNOSIS — N92.0 EXCESSIVE AND FREQUENT MENSTRUATION WITH REGULAR CYCLE: ICD-10-CM

## 2024-11-19 DIAGNOSIS — Z01.419 ENCOUNTER FOR GYNECOLOGICAL EXAMINATION (GENERAL) (ROUTINE) W/OUT ABNORMAL FINDINGS: ICD-10-CM

## 2024-11-19 DIAGNOSIS — N92.6 IRREGULAR MENSTRUATION, UNSPECIFIED: ICD-10-CM

## 2024-11-19 PROCEDURE — 99459 PELVIC EXAMINATION: CPT

## 2024-11-19 PROCEDURE — 99396 PREV VISIT EST AGE 40-64: CPT

## 2024-11-26 LAB
CYTOLOGY CVX/VAG DOC THIN PREP: NORMAL
HPV HIGH+LOW RISK DNA PNL CVX: NOT DETECTED

## 2025-03-01 ENCOUNTER — OFFICE (OUTPATIENT)
Facility: LOCATION | Age: 45
Setting detail: OPHTHALMOLOGY
End: 2025-03-01
Payer: COMMERCIAL

## 2025-03-01 DIAGNOSIS — G93.5: ICD-10-CM

## 2025-03-01 DIAGNOSIS — G43.109: ICD-10-CM

## 2025-03-01 DIAGNOSIS — H53.433: ICD-10-CM

## 2025-03-01 PROCEDURE — 92133 CPTRZD OPH DX IMG PST SGM ON: CPT | Performed by: OPHTHALMOLOGY

## 2025-03-01 PROCEDURE — 92004 COMPRE OPH EXAM NEW PT 1/>: CPT | Performed by: OPHTHALMOLOGY

## 2025-03-01 PROCEDURE — 92083 EXTENDED VISUAL FIELD XM: CPT | Performed by: OPHTHALMOLOGY

## 2025-03-01 ASSESSMENT — REFRACTION_CURRENTRX
OS_OVR_VA: 20/
OD_CYLINDER: -0.25
OD_AXIS: 071
OD_SPHERE: -0.75
OD_OVR_VA: 20/
OS_VPRISM_DIRECTION: SV
OD_VPRISM_DIRECTION: SV
OS_SPHERE: -0.75
OS_CYLINDER: 0.00
OS_AXIS: 0

## 2025-03-01 ASSESSMENT — REFRACTION_AUTOREFRACTION
OS_CYLINDER: -0.75
OS_AXIS: 159
OD_CYLINDER: -0.75
OD_AXIS: 047
OD_SPHERE: -1.25
OS_SPHERE: -1.50

## 2025-03-01 ASSESSMENT — KERATOMETRY
OS_K1POWER_DIOPTERS: 44.75
OD_AXISANGLE_DEGREES: 145
OS_AXISANGLE_DEGREES: 095
OD_K1POWER_DIOPTERS: 44.75
OS_K2POWER_DIOPTERS: 45.00
OD_K2POWER_DIOPTERS: 45.00

## 2025-03-01 ASSESSMENT — CONFRONTATIONAL VISUAL FIELD TEST (CVF)
OD_FINDINGS: FULL
OS_FINDINGS: FULL

## 2025-03-01 ASSESSMENT — VISUAL ACUITY
OD_BCVA: 20/20
OS_BCVA: 20/20

## 2025-08-18 NOTE — OB RN DELIVERY SUMMARY - NS_ADMITROM_OBGYN_ALL_OB
Goal Outcome Evaluation:  A/vss, up and playful, piv infusing, antibiotic infused without problems.  Continue to monitor.                           No